# Patient Record
Sex: MALE | Race: WHITE | NOT HISPANIC OR LATINO | Employment: OTHER | URBAN - METROPOLITAN AREA
[De-identification: names, ages, dates, MRNs, and addresses within clinical notes are randomized per-mention and may not be internally consistent; named-entity substitution may affect disease eponyms.]

---

## 2023-09-14 ENCOUNTER — OFFICE VISIT (OUTPATIENT)
Dept: PRIMARY CARE | Facility: CLINIC | Age: 78
End: 2023-09-14
Payer: MEDICARE

## 2023-09-14 ENCOUNTER — LAB (OUTPATIENT)
Dept: LAB | Facility: LAB | Age: 78
End: 2023-09-14
Payer: MEDICARE

## 2023-09-14 VITALS
WEIGHT: 174.8 LBS | BODY MASS INDEX: 25.81 KG/M2 | DIASTOLIC BLOOD PRESSURE: 74 MMHG | OXYGEN SATURATION: 96 % | SYSTOLIC BLOOD PRESSURE: 130 MMHG | HEART RATE: 70 BPM

## 2023-09-14 DIAGNOSIS — Z00.00 WELLNESS EXAMINATION: ICD-10-CM

## 2023-09-14 DIAGNOSIS — N52.9 MALE ERECTILE DISORDER: ICD-10-CM

## 2023-09-14 DIAGNOSIS — Z13.0 SCREENING FOR DEFICIENCY ANEMIA: ICD-10-CM

## 2023-09-14 DIAGNOSIS — Z13.6 ENCOUNTER FOR SCREENING FOR CARDIOVASCULAR DISORDERS: ICD-10-CM

## 2023-09-14 DIAGNOSIS — B00.1 RECURRENT COLD SORES: ICD-10-CM

## 2023-09-14 DIAGNOSIS — N40.1 BENIGN PROSTATIC HYPERPLASIA WITH LOWER URINARY TRACT SYMPTOMS, SYMPTOM DETAILS UNSPECIFIED: ICD-10-CM

## 2023-09-14 DIAGNOSIS — Z13.29 SCREENING FOR THYROID DISORDER: ICD-10-CM

## 2023-09-14 DIAGNOSIS — D69.6 THROMBOCYTOPENIA (CMS-HCC): Primary | ICD-10-CM

## 2023-09-14 DIAGNOSIS — I10 BENIGN ESSENTIAL HYPERTENSION: ICD-10-CM

## 2023-09-14 DIAGNOSIS — Z12.5 PROSTATE CANCER SCREENING: ICD-10-CM

## 2023-09-14 DIAGNOSIS — D69.6 THROMBOCYTOPENIA (CMS-HCC): ICD-10-CM

## 2023-09-14 PROBLEM — H91.90 HEARING LOSS: Status: ACTIVE | Noted: 2023-09-14

## 2023-09-14 PROBLEM — L30.9 ECZEMA: Status: ACTIVE | Noted: 2023-09-14

## 2023-09-14 PROBLEM — R39.9 LOWER URINARY TRACT SYMPTOMS (LUTS): Status: ACTIVE | Noted: 2023-09-14

## 2023-09-14 PROBLEM — N40.0 BPH (BENIGN PROSTATIC HYPERPLASIA): Status: ACTIVE | Noted: 2023-09-14

## 2023-09-14 PROBLEM — D22.9 ATYPICAL NEVUS: Status: ACTIVE | Noted: 2023-09-14

## 2023-09-14 LAB
ALANINE AMINOTRANSFERASE (SGPT) (U/L) IN SER/PLAS: 14 U/L (ref 10–52)
ALBUMIN (G/DL) IN SER/PLAS: 4.3 G/DL (ref 3.4–5)
ALBUMIN (MG/L) IN URINE: 8.2 MG/L
ALBUMIN/CREATININE (UG/MG) IN URINE: 7.9 UG/MG CRT (ref 0–30)
ALKALINE PHOSPHATASE (U/L) IN SER/PLAS: 108 U/L (ref 33–136)
ANION GAP IN SER/PLAS: 10 MMOL/L (ref 10–20)
APPEARANCE, URINE: CLEAR
ASPARTATE AMINOTRANSFERASE (SGOT) (U/L) IN SER/PLAS: 16 U/L (ref 9–39)
BASOPHILS (10*3/UL) IN BLOOD BY AUTOMATED COUNT: 0.02 X10E9/L (ref 0–0.1)
BASOPHILS/100 LEUKOCYTES IN BLOOD BY AUTOMATED COUNT: 0.5 % (ref 0–2)
BILIRUBIN TOTAL (MG/DL) IN SER/PLAS: 0.9 MG/DL (ref 0–1.2)
BILIRUBIN, URINE: NEGATIVE
BLOOD, URINE: NEGATIVE
CALCIUM (MG/DL) IN SER/PLAS: 9.5 MG/DL (ref 8.6–10.6)
CARBON DIOXIDE, TOTAL (MMOL/L) IN SER/PLAS: 27 MMOL/L (ref 21–32)
CHLORIDE (MMOL/L) IN SER/PLAS: 106 MMOL/L (ref 98–107)
CHOLESTEROL (MG/DL) IN SER/PLAS: 114 MG/DL (ref 0–199)
CHOLESTEROL IN HDL (MG/DL) IN SER/PLAS: 43.4 MG/DL
CHOLESTEROL/HDL RATIO: 2.6
COLOR, URINE: YELLOW
CREATININE (MG/DL) IN SER/PLAS: 0.89 MG/DL (ref 0.5–1.3)
CREATININE (MG/DL) IN URINE: 104 MG/DL (ref 20–370)
EOSINOPHILS (10*3/UL) IN BLOOD BY AUTOMATED COUNT: 0.06 X10E9/L (ref 0–0.4)
EOSINOPHILS/100 LEUKOCYTES IN BLOOD BY AUTOMATED COUNT: 1.4 % (ref 0–6)
ERYTHROCYTE DISTRIBUTION WIDTH (RATIO) BY AUTOMATED COUNT: 12.3 % (ref 11.5–14.5)
ERYTHROCYTE MEAN CORPUSCULAR HEMOGLOBIN CONCENTRATION (G/DL) BY AUTOMATED: 33.6 G/DL (ref 32–36)
ERYTHROCYTE MEAN CORPUSCULAR VOLUME (FL) BY AUTOMATED COUNT: 107 FL (ref 80–100)
ERYTHROCYTES (10*6/UL) IN BLOOD BY AUTOMATED COUNT: 4.32 X10E12/L (ref 4.5–5.9)
GFR MALE: 89 ML/MIN/1.73M2
GLUCOSE (MG/DL) IN SER/PLAS: 104 MG/DL (ref 74–99)
GLUCOSE, URINE: NEGATIVE MG/DL
HEMATOCRIT (%) IN BLOOD BY AUTOMATED COUNT: 46.1 % (ref 41–52)
HEMOGLOBIN (G/DL) IN BLOOD: 15.5 G/DL (ref 13.5–17.5)
IMMATURE GRANULOCYTES/100 LEUKOCYTES IN BLOOD BY AUTOMATED COUNT: 0.2 % (ref 0–0.9)
KETONES, URINE: NEGATIVE MG/DL
LDL: 55 MG/DL (ref 0–99)
LEUKOCYTE ESTERASE, URINE: NEGATIVE
LEUKOCYTES (10*3/UL) IN BLOOD BY AUTOMATED COUNT: 4.3 X10E9/L (ref 4.4–11.3)
LYMPHOCYTES (10*3/UL) IN BLOOD BY AUTOMATED COUNT: 1.13 X10E9/L (ref 0.8–3)
LYMPHOCYTES/100 LEUKOCYTES IN BLOOD BY AUTOMATED COUNT: 26 % (ref 13–44)
MONOCYTES (10*3/UL) IN BLOOD BY AUTOMATED COUNT: 0.42 X10E9/L (ref 0.05–0.8)
MONOCYTES/100 LEUKOCYTES IN BLOOD BY AUTOMATED COUNT: 9.7 % (ref 2–10)
NEUTROPHILS (10*3/UL) IN BLOOD BY AUTOMATED COUNT: 2.7 X10E9/L (ref 1.6–5.5)
NEUTROPHILS/100 LEUKOCYTES IN BLOOD BY AUTOMATED COUNT: 62.2 % (ref 40–80)
NITRITE, URINE: NEGATIVE
NRBC (PER 100 WBCS) BY AUTOMATED COUNT: 0 /100 WBC (ref 0–0)
PH, URINE: 5 (ref 5–8)
PLATELETS (10*3/UL) IN BLOOD AUTOMATED COUNT: 109 X10E9/L (ref 150–450)
POTASSIUM (MMOL/L) IN SER/PLAS: 4.2 MMOL/L (ref 3.5–5.3)
PROTEIN TOTAL: 6.7 G/DL (ref 6.4–8.2)
PROTEIN, URINE: NEGATIVE MG/DL
SODIUM (MMOL/L) IN SER/PLAS: 139 MMOL/L (ref 136–145)
SPECIFIC GRAVITY, URINE: 1.01 (ref 1–1.03)
THYROTROPIN (MIU/L) IN SER/PLAS BY DETECTION LIMIT <= 0.05 MIU/L: 0.7 MIU/L (ref 0.44–3.98)
TRIGLYCERIDE (MG/DL) IN SER/PLAS: 77 MG/DL (ref 0–149)
UREA NITROGEN (MG/DL) IN SER/PLAS: 17 MG/DL (ref 6–23)
UROBILINOGEN, URINE: <2 MG/DL (ref 0–1.9)
VLDL: 15 MG/DL (ref 0–40)

## 2023-09-14 PROCEDURE — 84154 ASSAY OF PSA FREE: CPT

## 2023-09-14 PROCEDURE — 3078F DIAST BP <80 MM HG: CPT | Performed by: STUDENT IN AN ORGANIZED HEALTH CARE EDUCATION/TRAINING PROGRAM

## 2023-09-14 PROCEDURE — 1126F AMNT PAIN NOTED NONE PRSNT: CPT | Performed by: STUDENT IN AN ORGANIZED HEALTH CARE EDUCATION/TRAINING PROGRAM

## 2023-09-14 PROCEDURE — 81003 URINALYSIS AUTO W/O SCOPE: CPT

## 2023-09-14 PROCEDURE — 3075F SYST BP GE 130 - 139MM HG: CPT | Performed by: STUDENT IN AN ORGANIZED HEALTH CARE EDUCATION/TRAINING PROGRAM

## 2023-09-14 PROCEDURE — 85025 COMPLETE CBC W/AUTO DIFF WBC: CPT

## 2023-09-14 PROCEDURE — 1036F TOBACCO NON-USER: CPT | Performed by: STUDENT IN AN ORGANIZED HEALTH CARE EDUCATION/TRAINING PROGRAM

## 2023-09-14 PROCEDURE — 80061 LIPID PANEL: CPT

## 2023-09-14 PROCEDURE — 82570 ASSAY OF URINE CREATININE: CPT

## 2023-09-14 PROCEDURE — 1159F MED LIST DOCD IN RCRD: CPT | Performed by: STUDENT IN AN ORGANIZED HEALTH CARE EDUCATION/TRAINING PROGRAM

## 2023-09-14 PROCEDURE — 84153 ASSAY OF PSA TOTAL: CPT

## 2023-09-14 PROCEDURE — 99214 OFFICE O/P EST MOD 30 MIN: CPT | Performed by: STUDENT IN AN ORGANIZED HEALTH CARE EDUCATION/TRAINING PROGRAM

## 2023-09-14 PROCEDURE — G0439 PPPS, SUBSEQ VISIT: HCPCS | Performed by: STUDENT IN AN ORGANIZED HEALTH CARE EDUCATION/TRAINING PROGRAM

## 2023-09-14 PROCEDURE — 82043 UR ALBUMIN QUANTITATIVE: CPT

## 2023-09-14 PROCEDURE — 80053 COMPREHEN METABOLIC PANEL: CPT

## 2023-09-14 PROCEDURE — 84443 ASSAY THYROID STIM HORMONE: CPT

## 2023-09-14 PROCEDURE — 1170F FXNL STATUS ASSESSED: CPT | Performed by: STUDENT IN AN ORGANIZED HEALTH CARE EDUCATION/TRAINING PROGRAM

## 2023-09-14 PROCEDURE — 1160F RVW MEDS BY RX/DR IN RCRD: CPT | Performed by: STUDENT IN AN ORGANIZED HEALTH CARE EDUCATION/TRAINING PROGRAM

## 2023-09-14 PROCEDURE — 36415 COLL VENOUS BLD VENIPUNCTURE: CPT

## 2023-09-14 RX ORDER — TADALAFIL 5 MG/1
5 TABLET ORAL DAILY
COMMUNITY
End: 2023-09-14 | Stop reason: ALTCHOICE

## 2023-09-14 RX ORDER — CLOBETASOL PROPIONATE 0.5 MG/G
1 CREAM TOPICAL 3 TIMES DAILY
COMMUNITY
Start: 2021-08-26 | End: 2023-09-14 | Stop reason: ALTCHOICE

## 2023-09-14 RX ORDER — LOSARTAN POTASSIUM 50 MG/1
50 TABLET ORAL DAILY
COMMUNITY
Start: 2011-03-10 | End: 2023-09-14 | Stop reason: SDUPTHER

## 2023-09-14 RX ORDER — ACYCLOVIR 400 MG/1
400 TABLET ORAL 3 TIMES DAILY PRN
Qty: 21 TABLET | Refills: 2 | Status: SHIPPED | OUTPATIENT
Start: 2023-09-14 | End: 2023-09-21

## 2023-09-14 RX ORDER — LOSARTAN POTASSIUM 50 MG/1
50 TABLET ORAL DAILY
Qty: 90 TABLET | Refills: 3 | Status: SHIPPED | OUTPATIENT
Start: 2023-09-14 | End: 2024-09-13

## 2023-09-14 RX ORDER — SILDENAFIL CITRATE 20 MG/1
TABLET ORAL
Qty: 50 TABLET | Refills: 11 | Status: SHIPPED | OUTPATIENT
Start: 2023-09-14

## 2023-09-14 RX ORDER — ACYCLOVIR 400 MG/1
400 TABLET ORAL 3 TIMES DAILY PRN
COMMUNITY
End: 2023-09-14 | Stop reason: SDUPTHER

## 2023-09-14 ASSESSMENT — ENCOUNTER SYMPTOMS
LOSS OF SENSATION IN FEET: 0
DEPRESSION: 0
OCCASIONAL FEELINGS OF UNSTEADINESS: 0

## 2023-09-14 ASSESSMENT — PATIENT HEALTH QUESTIONNAIRE - PHQ9
SUM OF ALL RESPONSES TO PHQ9 QUESTIONS 1 AND 2: 0
1. LITTLE INTEREST OR PLEASURE IN DOING THINGS: NOT AT ALL
2. FEELING DOWN, DEPRESSED OR HOPELESS: NOT AT ALL

## 2023-09-14 ASSESSMENT — ACTIVITIES OF DAILY LIVING (ADL)
TAKING_MEDICATION: INDEPENDENT
MANAGING_FINANCES: INDEPENDENT
GROCERY_SHOPPING: INDEPENDENT
DOING_HOUSEWORK: INDEPENDENT
BATHING: INDEPENDENT
DRESSING: INDEPENDENT

## 2023-09-14 ASSESSMENT — PAIN SCALES - GENERAL: PAINLEVEL: 0-NO PAIN

## 2023-09-14 NOTE — PROGRESS NOTES
Subjective   Patient ID: Ricci Granados is a 78 y.o. male who presents for Medicare Annual Wellness Visit Subsequent (He is fasting.) and Med Refill.    HPI comes in for Medicare wellness.  No complaints    Review of Systems  Constitutional: NO F, chills, or sweats  Eyes: no blurred vision or visual disturbance  ENT: no hearing loss, no congestion, no nasal discharge, no hoarseness and no sore throat.   Cardiovascular: no chest pain, no edema, no palps and no syncope.   Respiratory: no cough,no s.o.b. and no wheezing  Gastrointestinal: no abdominal pain, No C/D no N/V, no blood in stools  Genitourinary: no dysuria, no change in urinary frequency, no urinary hesitancy and no feelings of urinary urgency.   Musculoskeletal: no arthralgias,  no back pain and no myalgias.   Integumentary: no new skin lesions and no rashes.   Neurological: no difficulty walking, no headache, no limb weakness, no numbness and no tingling.   Psychiatric: no anxiety, no depression, no anhedonia and no substance use disorders.   Endocrine: no recent weight gain and no recent weight loss.   Hematologic/Lymphatic: no tendency for easy bruising and no swollen glands.  Objective   /74 (BP Location: Left arm, Patient Position: Sitting, BP Cuff Size: Adult)   Pulse 70   Wt 79.3 kg (174 lb 12.8 oz)   SpO2 96%   BMI 25.81 kg/m²     Physical Exam  gen- a & o x 3, nad, pleasant  heent- eomi, perrla, ear canals patent, TM's non-erythematous, no fluid, frontal and maxillary sinus's nontender  neck- supple, nontender, no palpable or enlarged nodes, no thyromegaly  heart- rrr, no murmurs  lungs- cta b/l , no w/r/r  chest- symmetric, nontender  ab- soft, nontender, no palpable organomegaly, postive bowel sounds  ex's- no c/c/e  neuro- CNs 2-12 grossly intact, full sensation and strength in all extremities    Assessment/Plan     #1. Well visit physical. Medicare wellness.  No concerns on exam.  Fasting blood work today we'll call with results in  1-2 weeks..  Vaccinations are up-to-date.  Colonoscopy up-to-date until 2025.     #2. Hypertension. Currently stable. Continue losartan 50 mg once per day in the morning.     #3. BPH, lower urinary tract symptoms.  Stable without meds at this time.  Prescription for generic Viagra for ED     #4. Borderline low platelets.  Have been stable  Continue to monitor CBC ordered today.     #5. Recurrent cold sores.  Refilled acyclovir to use as needed.

## 2023-09-14 NOTE — PATIENT INSTRUCTIONS
#1. Well visit physical. Medicare wellness.  No concerns on exam.  Fasting blood work today we'll call with results in 1-2 weeks..  Vaccinations are up-to-date.  Colonoscopy up-to-date until 2025.     #2. Hypertension. Currently stable. Continue losartan 50 mg once per day in the morning.     #3. BPH, lower urinary tract symptoms.  Stable without meds at this time.  Prescription for generic Viagra for ED     #4. Borderline low platelets.  Have been stable  Continue to monitor CBC ordered today.     #5. Recurrent cold sores.  Refilled acyclovir to use as needed.

## 2023-09-18 LAB
PROSTATE SPECIFIC AG (NG/ML) IN SER/PLAS: 4.2 NG/ML (ref 0–4)
PROSTATE SPECIFIC AG FREE (NG/ML) IN SER/PLAS: 1.7 NG/ML
PROSTATE SPECIFIC AG FREE/PROSTATE SPECIFIC AG TOTAL IN SER/PLAS: 40 %

## 2023-09-20 ENCOUNTER — TELEPHONE (OUTPATIENT)
Dept: PRIMARY CARE | Facility: CLINIC | Age: 78
End: 2023-09-20
Payer: MEDICARE

## 2023-09-21 ENCOUNTER — TELEPHONE (OUTPATIENT)
Dept: PRIMARY CARE | Facility: CLINIC | Age: 78
End: 2023-09-21
Payer: MEDICARE

## 2024-04-11 ENCOUNTER — OFFICE VISIT (OUTPATIENT)
Dept: PRIMARY CARE | Facility: CLINIC | Age: 79
End: 2024-04-11
Payer: MEDICARE

## 2024-04-11 VITALS
HEART RATE: 62 BPM | WEIGHT: 185 LBS | BODY MASS INDEX: 27.32 KG/M2 | SYSTOLIC BLOOD PRESSURE: 148 MMHG | DIASTOLIC BLOOD PRESSURE: 70 MMHG | OXYGEN SATURATION: 98 %

## 2024-04-11 DIAGNOSIS — D22.9 ATYPICAL NEVUS: Primary | ICD-10-CM

## 2024-04-11 PROCEDURE — 99213 OFFICE O/P EST LOW 20 MIN: CPT | Performed by: STUDENT IN AN ORGANIZED HEALTH CARE EDUCATION/TRAINING PROGRAM

## 2024-04-11 PROCEDURE — 88305 TISSUE EXAM BY PATHOLOGIST: CPT | Performed by: DERMATOLOGY

## 2024-04-11 PROCEDURE — 1036F TOBACCO NON-USER: CPT | Performed by: STUDENT IN AN ORGANIZED HEALTH CARE EDUCATION/TRAINING PROGRAM

## 2024-04-11 PROCEDURE — 3078F DIAST BP <80 MM HG: CPT | Performed by: STUDENT IN AN ORGANIZED HEALTH CARE EDUCATION/TRAINING PROGRAM

## 2024-04-11 PROCEDURE — 1160F RVW MEDS BY RX/DR IN RCRD: CPT | Performed by: STUDENT IN AN ORGANIZED HEALTH CARE EDUCATION/TRAINING PROGRAM

## 2024-04-11 PROCEDURE — 1159F MED LIST DOCD IN RCRD: CPT | Performed by: STUDENT IN AN ORGANIZED HEALTH CARE EDUCATION/TRAINING PROGRAM

## 2024-04-11 PROCEDURE — 1126F AMNT PAIN NOTED NONE PRSNT: CPT | Performed by: STUDENT IN AN ORGANIZED HEALTH CARE EDUCATION/TRAINING PROGRAM

## 2024-04-11 PROCEDURE — 3077F SYST BP >= 140 MM HG: CPT | Performed by: STUDENT IN AN ORGANIZED HEALTH CARE EDUCATION/TRAINING PROGRAM

## 2024-04-11 ASSESSMENT — PAIN SCALES - GENERAL: PAINLEVEL: 0-NO PAIN

## 2024-04-11 ASSESSMENT — ENCOUNTER SYMPTOMS: DEPRESSION: 0

## 2024-04-11 NOTE — PROGRESS NOTES
Subjective   Patient ID: Ricci Granados is a 79 y.o. male who presents for Establish Care (Mole on back that has crust and blood around ).    HPI comes in for atypical nevus left posterior back for about 1 month.  Has been flaking and bleeding    Review of Systems  Constitutional: NO F, chills, or sweats  Integumentary: Concern for irregular nevus  Objective   /70 (BP Location: Left arm, Patient Position: Sitting)   Pulse 62   Wt 83.9 kg (185 lb)   SpO2 98%   BMI 27.32 kg/m²     Physical Exam  Derm-left posterior back near the left shoulder, 0.5 cm atypical nevus, erythematous scaly, has appearance of basal cell  Assessment/Plan     #1.  Atypical nevus left posterior back.  Shave biopsy today in clinic.  Specimen will be sent to pathology for further evaluation.  Discussed based on results we may need to see dermatology he is in agreement

## 2024-04-15 DIAGNOSIS — C44.91 BASAL CELL CARCINOMA (BCC), UNSPECIFIED SITE: Primary | ICD-10-CM

## 2024-04-15 LAB
LABORATORY COMMENT REPORT: NORMAL
PATH REPORT.FINAL DX SPEC: NORMAL
PATH REPORT.GROSS SPEC: NORMAL
PATH REPORT.MICROSCOPIC SPEC OTHER STN: NORMAL
PATH REPORT.RELEVANT HX SPEC: NORMAL
PATH REPORT.TOTAL CANCER: NORMAL

## 2024-09-11 ENCOUNTER — TELEPHONE (OUTPATIENT)
Dept: PRIMARY CARE | Facility: CLINIC | Age: 79
End: 2024-09-11
Payer: MEDICARE

## 2024-09-17 ENCOUNTER — APPOINTMENT (OUTPATIENT)
Dept: PRIMARY CARE | Facility: CLINIC | Age: 79
End: 2024-09-17
Payer: MEDICARE

## 2024-09-18 ENCOUNTER — APPOINTMENT (OUTPATIENT)
Dept: PRIMARY CARE | Facility: CLINIC | Age: 79
End: 2024-09-18
Payer: MEDICARE

## 2024-09-18 VITALS
HEIGHT: 69 IN | BODY MASS INDEX: 25.77 KG/M2 | OXYGEN SATURATION: 96 % | WEIGHT: 174 LBS | DIASTOLIC BLOOD PRESSURE: 88 MMHG | HEART RATE: 73 BPM | SYSTOLIC BLOOD PRESSURE: 161 MMHG

## 2024-09-18 DIAGNOSIS — R25.2 LEG CRAMPING: ICD-10-CM

## 2024-09-18 DIAGNOSIS — Z00.00 MEDICARE ANNUAL WELLNESS VISIT, SUBSEQUENT: Primary | ICD-10-CM

## 2024-09-18 DIAGNOSIS — Z71.89 GOALS OF CARE, COUNSELING/DISCUSSION: ICD-10-CM

## 2024-09-18 DIAGNOSIS — I10 BENIGN ESSENTIAL HYPERTENSION: ICD-10-CM

## 2024-09-18 PROCEDURE — 1160F RVW MEDS BY RX/DR IN RCRD: CPT | Performed by: INTERNAL MEDICINE

## 2024-09-18 PROCEDURE — 1159F MED LIST DOCD IN RCRD: CPT | Performed by: INTERNAL MEDICINE

## 2024-09-18 PROCEDURE — 3077F SYST BP >= 140 MM HG: CPT | Performed by: INTERNAL MEDICINE

## 2024-09-18 PROCEDURE — 1170F FXNL STATUS ASSESSED: CPT | Performed by: INTERNAL MEDICINE

## 2024-09-18 PROCEDURE — 3079F DIAST BP 80-89 MM HG: CPT | Performed by: INTERNAL MEDICINE

## 2024-09-18 PROCEDURE — G0439 PPPS, SUBSEQ VISIT: HCPCS | Performed by: INTERNAL MEDICINE

## 2024-09-18 PROCEDURE — 1036F TOBACCO NON-USER: CPT | Performed by: INTERNAL MEDICINE

## 2024-09-18 PROCEDURE — 1123F ACP DISCUSS/DSCN MKR DOCD: CPT | Performed by: INTERNAL MEDICINE

## 2024-09-18 PROCEDURE — 1158F ADVNC CARE PLAN TLK DOCD: CPT | Performed by: INTERNAL MEDICINE

## 2024-09-18 PROCEDURE — 99213 OFFICE O/P EST LOW 20 MIN: CPT | Performed by: INTERNAL MEDICINE

## 2024-09-18 RX ORDER — LOSARTAN POTASSIUM 100 MG/1
100 TABLET ORAL DAILY
Start: 2024-09-18 | End: 2025-09-18

## 2024-09-18 RX ORDER — ACYCLOVIR 400 MG/1
400 TABLET ORAL AS NEEDED
COMMUNITY

## 2024-09-18 ASSESSMENT — ACTIVITIES OF DAILY LIVING (ADL)
BATHING: INDEPENDENT
DOING_HOUSEWORK: INDEPENDENT
DRESSING: INDEPENDENT
TAKING_MEDICATION: INDEPENDENT
MANAGING_FINANCES: INDEPENDENT
GROCERY_SHOPPING: INDEPENDENT

## 2024-09-18 ASSESSMENT — PATIENT HEALTH QUESTIONNAIRE - PHQ9
1. LITTLE INTEREST OR PLEASURE IN DOING THINGS: NOT AT ALL
2. FEELING DOWN, DEPRESSED OR HOPELESS: NOT AT ALL
SUM OF ALL RESPONSES TO PHQ9 QUESTIONS 1 AND 2: 0

## 2024-09-18 ASSESSMENT — ENCOUNTER SYMPTOMS
FEVER: 0
COUGH: 1
SORE THROAT: 1

## 2024-09-18 NOTE — ASSESSMENT & PLAN NOTE
-BP elevated today in setting of being sick. When reviewing the chart, can see BP has been consistently higher this year. Will double losartan dose from 50mg daily to 100mg daily.  Orders:    Comprehensive metabolic panel; Future    CBC; Future    Magnesium; Future    losartan (Cozaar) 100 mg tablet; Take 1 tablet (100 mg) by mouth once daily.

## 2024-09-18 NOTE — PROGRESS NOTES
"Subjective   Reason for Visit: Ricci Granados is an 79 y.o. male here for a Medicare Wellness visit.               Here for MAW. Getting established from Dr. Green's office.    Pt has a sore throat and cough that started 9/10. His wife was found to have COVID 9/7.    PMH:  -HTN: Taking losartan.  -BPH: Not on medication at this time. Has nocturia.  -Melanoma: Sees [facility] dermatology. Recently had a lesion removed from his back.    Tries to be exercise every morning with his wife. Has been more limited this week d/t recovering from COVID.    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      Patient Care Team:  Evaristo Tijerina MD as PCP - General (Internal Medicine)  Wes Green DO as PCP - [employer] PCP     Review of Systems   Constitutional:  Negative for fever.   HENT:  Positive for sore throat.    Respiratory:  Positive for cough.        Objective   Vitals:  /88   Pulse 73   Ht 1.753 m (5' 9\")   Wt 78.9 kg (174 lb)   SpO2 96%   BMI 25.70 kg/m²       Physical Exam  Constitutional:       General: He is not in acute distress.     Appearance: He is not ill-appearing, toxic-appearing or diaphoretic.   HENT:      Head: Normocephalic and atraumatic.   Eyes:      Conjunctiva/sclera: Conjunctivae normal.   Neurological:      Mental Status: He is alert.       Assessment & Plan  Medicare annual wellness visit, subsequent    Orders:  •  PSA; Future  •  Ferritin; Future  •  Hemoglobin A1c; Future    Goals of care, counseling/discussion         Leg cramping  -Happening more at night. Will check electrolytes and ferritin lvl.       Benign essential hypertension  -BP elevated today in setting of being sick. When reviewing the chart, can see BP has been consistently higher this year. Will double losartan dose from 50mg daily to 100mg daily.  Orders:  •  Comprehensive metabolic panel; Future  •  CBC; Future  •  Magnesium; Future  •  losartan (Cozaar) 100 mg tablet; Take 1 tablet (100 " mg) by mouth once daily.         Advance Directives Discussion  Advanced Care Planning (including a Living Will, Healthcare POA, as well as specific end of life choices and/or directives), was discussed with the patient and/or surrogate, voluntarily, and details of that discussion documented in the Problem List (under Advanced Directives Discussion) of the medical record.  Pt does have a living will and HCPOA. States his wife would be his HCPOA, followed by his son as the alternate (only has one child). Contact numbers added to the chart. Pt would want to be full code.   (~16 min spent discussing above)

## 2024-09-26 ENCOUNTER — LAB (OUTPATIENT)
Dept: LAB | Facility: LAB | Age: 79
End: 2024-09-26
Payer: MEDICARE

## 2024-09-26 DIAGNOSIS — I10 BENIGN ESSENTIAL HYPERTENSION: ICD-10-CM

## 2024-09-26 DIAGNOSIS — Z00.00 MEDICARE ANNUAL WELLNESS VISIT, SUBSEQUENT: ICD-10-CM

## 2024-09-26 LAB
ALBUMIN SERPL BCP-MCNC: 3.9 G/DL (ref 3.4–5)
ALP SERPL-CCNC: 96 U/L (ref 33–136)
ALT SERPL W P-5'-P-CCNC: 15 U/L (ref 10–52)
ANION GAP SERPL CALC-SCNC: 15 MMOL/L (ref 10–20)
AST SERPL W P-5'-P-CCNC: 13 U/L (ref 9–39)
BILIRUB SERPL-MCNC: 0.6 MG/DL (ref 0–1.2)
BUN SERPL-MCNC: 26 MG/DL (ref 6–23)
CALCIUM SERPL-MCNC: 9.1 MG/DL (ref 8.6–10.6)
CHLORIDE SERPL-SCNC: 109 MMOL/L (ref 98–107)
CO2 SERPL-SCNC: 24 MMOL/L (ref 21–32)
CREAT SERPL-MCNC: 1.65 MG/DL (ref 0.5–1.3)
EGFRCR SERPLBLD CKD-EPI 2021: 43 ML/MIN/1.73M*2
ERYTHROCYTE [DISTWIDTH] IN BLOOD BY AUTOMATED COUNT: 12.1 % (ref 11.5–14.5)
EST. AVERAGE GLUCOSE BLD GHB EST-MCNC: 126 MG/DL
FERRITIN SERPL-MCNC: 495 NG/ML (ref 20–300)
GLUCOSE SERPL-MCNC: 92 MG/DL (ref 74–99)
HBA1C MFR BLD: 6 %
HCT VFR BLD AUTO: 38.2 % (ref 41–52)
HGB BLD-MCNC: 12.4 G/DL (ref 13.5–17.5)
MAGNESIUM SERPL-MCNC: 2.17 MG/DL (ref 1.6–2.4)
MCH RBC QN AUTO: 34.3 PG (ref 26–34)
MCHC RBC AUTO-ENTMCNC: 32.5 G/DL (ref 32–36)
MCV RBC AUTO: 106 FL (ref 80–100)
NRBC BLD-RTO: 0 /100 WBCS (ref 0–0)
PLATELET # BLD AUTO: 176 X10*3/UL (ref 150–450)
POTASSIUM SERPL-SCNC: 4.3 MMOL/L (ref 3.5–5.3)
PROT SERPL-MCNC: 6.7 G/DL (ref 6.4–8.2)
PSA SERPL-MCNC: 7.07 NG/ML
RBC # BLD AUTO: 3.62 X10*6/UL (ref 4.5–5.9)
SODIUM SERPL-SCNC: 144 MMOL/L (ref 136–145)
WBC # BLD AUTO: 4.7 X10*3/UL (ref 4.4–11.3)

## 2024-09-26 PROCEDURE — 83735 ASSAY OF MAGNESIUM: CPT

## 2024-09-26 PROCEDURE — 84153 ASSAY OF PSA TOTAL: CPT

## 2024-09-26 PROCEDURE — 83036 HEMOGLOBIN GLYCOSYLATED A1C: CPT

## 2024-09-26 PROCEDURE — 80053 COMPREHEN METABOLIC PANEL: CPT

## 2024-09-26 PROCEDURE — 36415 COLL VENOUS BLD VENIPUNCTURE: CPT

## 2024-09-26 PROCEDURE — 82728 ASSAY OF FERRITIN: CPT

## 2024-09-26 PROCEDURE — 85027 COMPLETE CBC AUTOMATED: CPT

## 2024-09-27 DIAGNOSIS — R97.20 ELEVATED PSA: Primary | ICD-10-CM

## 2024-09-27 DIAGNOSIS — I10 BENIGN ESSENTIAL HYPERTENSION: ICD-10-CM

## 2024-09-27 RX ORDER — AMLODIPINE BESYLATE 5 MG/1
5 TABLET ORAL DAILY
Qty: 30 TABLET | Refills: 4 | Status: SHIPPED | OUTPATIENT
Start: 2024-09-27

## 2024-09-27 NOTE — RESULT ENCOUNTER NOTE
Tried calling patient x3 to review labwork that resulted (particularly elevated PSA level and worsened kidney function after increasing losartan dose). No answer. Voicemail left for patient to give us a call back.

## 2024-09-27 NOTE — PROGRESS NOTES
Pt called back. We reviewed his elevated prostate level. Pt w/o new  symptoms. Is agreeable to seeing urology. Referral placed and number provided.    We discussed kidney labwork as well and how it worsened after doubling the dose of losartan. Pt to go back to 1 losartan a day. Will repeat BMP the day before his next appt. Discussed amlodipine and side effects; pt agreeable with starting this in the meantime. All questions from pt and his wife answered.      09/27/24 at 3:19 PM - Evaristo Tijerina MD

## 2024-10-04 ENCOUNTER — LAB (OUTPATIENT)
Dept: LAB | Facility: LAB | Age: 79
End: 2024-10-04
Payer: MEDICARE

## 2024-10-04 DIAGNOSIS — I10 BENIGN ESSENTIAL HYPERTENSION: ICD-10-CM

## 2024-10-04 LAB
ANION GAP SERPL CALC-SCNC: 12 MMOL/L (ref 10–20)
BUN SERPL-MCNC: 24 MG/DL (ref 6–23)
CALCIUM SERPL-MCNC: 9 MG/DL (ref 8.6–10.6)
CHLORIDE SERPL-SCNC: 107 MMOL/L (ref 98–107)
CO2 SERPL-SCNC: 27 MMOL/L (ref 21–32)
CREAT SERPL-MCNC: 1.89 MG/DL (ref 0.5–1.3)
EGFRCR SERPLBLD CKD-EPI 2021: 36 ML/MIN/1.73M*2
GLUCOSE SERPL-MCNC: 100 MG/DL (ref 74–99)
POTASSIUM SERPL-SCNC: 4.1 MMOL/L (ref 3.5–5.3)
SODIUM SERPL-SCNC: 142 MMOL/L (ref 136–145)

## 2024-10-04 PROCEDURE — 80048 BASIC METABOLIC PNL TOTAL CA: CPT

## 2024-10-04 PROCEDURE — 36415 COLL VENOUS BLD VENIPUNCTURE: CPT

## 2024-10-08 ENCOUNTER — APPOINTMENT (OUTPATIENT)
Dept: PRIMARY CARE | Facility: CLINIC | Age: 79
End: 2024-10-08
Payer: MEDICARE

## 2024-10-08 VITALS
OXYGEN SATURATION: 94 % | SYSTOLIC BLOOD PRESSURE: 166 MMHG | HEIGHT: 69 IN | WEIGHT: 175 LBS | BODY MASS INDEX: 25.92 KG/M2 | DIASTOLIC BLOOD PRESSURE: 75 MMHG | HEART RATE: 65 BPM

## 2024-10-08 DIAGNOSIS — B00.1 COLD SORE: ICD-10-CM

## 2024-10-08 DIAGNOSIS — N52.9 ERECTILE DISORDER: ICD-10-CM

## 2024-10-08 DIAGNOSIS — I10 BENIGN ESSENTIAL HYPERTENSION: Primary | ICD-10-CM

## 2024-10-08 DIAGNOSIS — N17.9 AKI (ACUTE KIDNEY INJURY) (CMS-HCC): ICD-10-CM

## 2024-10-08 PROBLEM — D69.6 THROMBOCYTOPENIA (CMS-HCC): Status: RESOLVED | Noted: 2023-09-14 | Resolved: 2024-10-08

## 2024-10-08 PROCEDURE — 1158F ADVNC CARE PLAN TLK DOCD: CPT | Performed by: INTERNAL MEDICINE

## 2024-10-08 PROCEDURE — 1159F MED LIST DOCD IN RCRD: CPT | Performed by: INTERNAL MEDICINE

## 2024-10-08 PROCEDURE — 3077F SYST BP >= 140 MM HG: CPT | Performed by: INTERNAL MEDICINE

## 2024-10-08 PROCEDURE — 99213 OFFICE O/P EST LOW 20 MIN: CPT | Performed by: INTERNAL MEDICINE

## 2024-10-08 PROCEDURE — 1123F ACP DISCUSS/DSCN MKR DOCD: CPT | Performed by: INTERNAL MEDICINE

## 2024-10-08 PROCEDURE — 3078F DIAST BP <80 MM HG: CPT | Performed by: INTERNAL MEDICINE

## 2024-10-08 PROCEDURE — 1036F TOBACCO NON-USER: CPT | Performed by: INTERNAL MEDICINE

## 2024-10-08 RX ORDER — ATENOLOL 25 MG/1
25 TABLET ORAL DAILY
Qty: 30 TABLET | Refills: 5 | Status: SHIPPED | OUTPATIENT
Start: 2024-10-08 | End: 2025-04-06

## 2024-10-08 RX ORDER — ACYCLOVIR 400 MG/1
400 TABLET ORAL 3 TIMES DAILY PRN
Qty: 21 TABLET | Refills: 3 | Status: SHIPPED | OUTPATIENT
Start: 2024-10-08

## 2024-10-08 RX ORDER — SILDENAFIL CITRATE 20 MG/1
40-100 TABLET ORAL DAILY PRN
Qty: 30 TABLET | Refills: 11 | Status: SHIPPED | OUTPATIENT
Start: 2024-10-08 | End: 2025-10-08

## 2024-10-08 ASSESSMENT — ENCOUNTER SYMPTOMS
SORE THROAT: 1
COUGH: 1

## 2024-10-08 NOTE — ASSESSMENT & PLAN NOTE
-Kidney function worsened despite stopping losartan. After reviewing hx, found pt drinks 2 pitchers of iced tea a day. Concerned that this is dehydrating pt; he is to cut down to 1 pitcher at the most a day. To increase water intake. Will recheck labwork 1-2 days before next appt.

## 2024-10-08 NOTE — PROGRESS NOTES
"Subjective   Patient ID: Ricci Granados is a 79 y.o. male who presents for Follow-up.    Denies having any urinary symptoms. Pt stopped the losartan after getting the amlodipine. Pt states he lives on iced tea (drinks around 2 pitchers a day), but does also drink water.    Is seeing [facility] dermatology for follow up of basal cell carcinoma of the shoulders.    Has had more mucous building up in his throat and a non-productive cough since last appt. Denies congestion or post-nasal drip.        Review of Systems   HENT:  Positive for sore throat. Negative for congestion and postnasal drip.    Respiratory:  Positive for cough.        /75   Pulse 65   Ht 1.753 m (5' 9\")   Wt 79.4 kg (175 lb)   SpO2 94%   BMI 25.84 kg/m²   Objective   Physical Exam  Constitutional:       General: He is not in acute distress.     Appearance: He is not ill-appearing, toxic-appearing or diaphoretic.   HENT:      Head: Normocephalic and atraumatic.   Eyes:      Conjunctiva/sclera: Conjunctivae normal.   Neurological:      Mental Status: He is alert.         Assessment/Plan   Problem List Items Addressed This Visit             ICD-10-CM    Benign essential hypertension - Primary I10     -BP elevated today on just amlodipine 5mg daily. Will double dose to 10mg daily.  -Reviewed that its unlikely this alone will control BP; will add atenolol. Reviewed side effects. Pt agreeable. Will see back in 2-3 weeks to follow up BP.         Relevant Medications    atenolol (Tenormin) 25 mg tablet    Other Relevant Orders    Basic metabolic panel    Erectile disorder N52.9     -Refilled medication.         Relevant Medications    sildenafil (Revatio) 20 mg tablet    AMARA (acute kidney injury) (CMS-HCC) N17.9     -Kidney function worsened despite stopping losartan. After reviewing hx, found pt drinks 2 pitchers of iced tea a day. Concerned that this is dehydrating pt; he is to cut down to 1 pitcher at the most a day. To increase water intake. Will " recheck labwork 1-2 days before next appt.          Other Visit Diagnoses         Codes    Cold sore     B00.1    Relevant Medications    acyclovir (Zovirax) 400 mg tablet                 Evaristo Tijerina MD 10/08/24 10:59 AM

## 2024-10-08 NOTE — ASSESSMENT & PLAN NOTE
-BP elevated today on just amlodipine 5mg daily. Will double dose to 10mg daily.  -Reviewed that its unlikely this alone will control BP; will add atenolol. Reviewed side effects. Pt agreeable. Will see back in 2-3 weeks to follow up BP.

## 2024-10-17 ENCOUNTER — TELEPHONE (OUTPATIENT)
Dept: PRIMARY CARE | Facility: CLINIC | Age: 79
End: 2024-10-17
Payer: MEDICARE

## 2024-10-17 DIAGNOSIS — I10 BENIGN ESSENTIAL HYPERTENSION: ICD-10-CM

## 2024-10-17 RX ORDER — AMLODIPINE BESYLATE 10 MG/1
10 TABLET ORAL DAILY
Qty: 90 TABLET | Refills: 3 | Status: SHIPPED | OUTPATIENT
Start: 2024-10-17

## 2024-10-24 ENCOUNTER — LAB (OUTPATIENT)
Dept: LAB | Facility: LAB | Age: 79
End: 2024-10-24
Payer: MEDICARE

## 2024-10-24 DIAGNOSIS — I10 BENIGN ESSENTIAL HYPERTENSION: ICD-10-CM

## 2024-10-24 LAB
ANION GAP SERPL CALC-SCNC: 14 MMOL/L (ref 10–20)
BUN SERPL-MCNC: 31 MG/DL (ref 6–23)
CALCIUM SERPL-MCNC: 8.8 MG/DL (ref 8.6–10.6)
CHLORIDE SERPL-SCNC: 107 MMOL/L (ref 98–107)
CO2 SERPL-SCNC: 27 MMOL/L (ref 21–32)
CREAT SERPL-MCNC: 2.05 MG/DL (ref 0.5–1.3)
EGFRCR SERPLBLD CKD-EPI 2021: 33 ML/MIN/1.73M*2
GLUCOSE SERPL-MCNC: 133 MG/DL (ref 74–99)
POTASSIUM SERPL-SCNC: 4.3 MMOL/L (ref 3.5–5.3)
SODIUM SERPL-SCNC: 144 MMOL/L (ref 136–145)

## 2024-10-24 PROCEDURE — 36415 COLL VENOUS BLD VENIPUNCTURE: CPT

## 2024-10-24 PROCEDURE — 80048 BASIC METABOLIC PNL TOTAL CA: CPT

## 2024-10-28 ENCOUNTER — LAB (OUTPATIENT)
Dept: LAB | Facility: LAB | Age: 79
End: 2024-10-28
Payer: MEDICARE

## 2024-10-28 ENCOUNTER — APPOINTMENT (OUTPATIENT)
Dept: PRIMARY CARE | Facility: CLINIC | Age: 79
End: 2024-10-28
Payer: MEDICARE

## 2024-10-28 VITALS
SYSTOLIC BLOOD PRESSURE: 160 MMHG | WEIGHT: 177.9 LBS | BODY MASS INDEX: 26.27 KG/M2 | HEART RATE: 58 BPM | TEMPERATURE: 98 F | RESPIRATION RATE: 22 BRPM | OXYGEN SATURATION: 94 % | DIASTOLIC BLOOD PRESSURE: 80 MMHG

## 2024-10-28 DIAGNOSIS — N17.9 AKI (ACUTE KIDNEY INJURY) (CMS-HCC): ICD-10-CM

## 2024-10-28 DIAGNOSIS — I10 BENIGN ESSENTIAL HYPERTENSION: Primary | ICD-10-CM

## 2024-10-28 LAB
APPEARANCE UR: CLEAR
BILIRUB UR STRIP.AUTO-MCNC: NEGATIVE MG/DL
COLOR UR: NORMAL
CREAT UR-MCNC: 57.6 MG/DL (ref 20–370)
GLUCOSE UR STRIP.AUTO-MCNC: NORMAL MG/DL
KETONES UR STRIP.AUTO-MCNC: NEGATIVE MG/DL
LEUKOCYTE ESTERASE UR QL STRIP.AUTO: NEGATIVE
NITRITE UR QL STRIP.AUTO: NEGATIVE
PH UR STRIP.AUTO: 5.5 [PH]
PROT UR STRIP.AUTO-MCNC: NEGATIVE MG/DL
RBC # UR STRIP.AUTO: NEGATIVE /UL
SODIUM UR-SCNC: 67 MMOL/L
SODIUM/CREAT UR-RTO: 116 MMOL/G CREAT
SP GR UR STRIP.AUTO: 1.01
UROBILINOGEN UR STRIP.AUTO-MCNC: NORMAL MG/DL

## 2024-10-28 PROCEDURE — 1123F ACP DISCUSS/DSCN MKR DOCD: CPT | Performed by: INTERNAL MEDICINE

## 2024-10-28 PROCEDURE — 82570 ASSAY OF URINE CREATININE: CPT

## 2024-10-28 PROCEDURE — 3077F SYST BP >= 140 MM HG: CPT | Performed by: INTERNAL MEDICINE

## 2024-10-28 PROCEDURE — 99214 OFFICE O/P EST MOD 30 MIN: CPT | Performed by: INTERNAL MEDICINE

## 2024-10-28 PROCEDURE — 84300 ASSAY OF URINE SODIUM: CPT

## 2024-10-28 PROCEDURE — 1159F MED LIST DOCD IN RCRD: CPT | Performed by: INTERNAL MEDICINE

## 2024-10-28 PROCEDURE — 81003 URINALYSIS AUTO W/O SCOPE: CPT

## 2024-10-28 PROCEDURE — 3078F DIAST BP <80 MM HG: CPT | Performed by: INTERNAL MEDICINE

## 2024-10-28 RX ORDER — CARVEDILOL PHOSPHATE 10 MG/1
10 CAPSULE, EXTENDED RELEASE ORAL DAILY
Qty: 30 CAPSULE | Refills: 11 | Status: SHIPPED | OUTPATIENT
Start: 2024-10-28 | End: 2025-10-28

## 2024-10-28 RX ORDER — ACETAMINOPHEN 500 MG
TABLET ORAL
Qty: 1 KIT | Refills: 0 | Status: SHIPPED | OUTPATIENT
Start: 2024-10-28

## 2024-10-28 ASSESSMENT — PATIENT HEALTH QUESTIONNAIRE - PHQ9
SUM OF ALL RESPONSES TO PHQ9 QUESTIONS 1 AND 2: 0
2. FEELING DOWN, DEPRESSED OR HOPELESS: NOT AT ALL
1. LITTLE INTEREST OR PLEASURE IN DOING THINGS: NOT AT ALL

## 2024-10-28 ASSESSMENT — ENCOUNTER SYMPTOMS
LIGHT-HEADEDNESS: 0
DIZZINESS: 0
MYALGIAS: 1

## 2024-10-29 ENCOUNTER — TELEPHONE (OUTPATIENT)
Dept: PRIMARY CARE | Facility: CLINIC | Age: 79
End: 2024-10-29
Payer: MEDICARE

## 2024-10-29 DIAGNOSIS — N17.9 AKI (ACUTE KIDNEY INJURY) (CMS-HCC): Primary | ICD-10-CM

## 2024-10-31 ENCOUNTER — TELEPHONE (OUTPATIENT)
Dept: PRIMARY CARE | Facility: CLINIC | Age: 79
End: 2024-10-31

## 2024-10-31 ENCOUNTER — APPOINTMENT (OUTPATIENT)
Dept: RADIOLOGY | Facility: HOSPITAL | Age: 79
End: 2024-10-31
Payer: MEDICARE

## 2024-10-31 ENCOUNTER — HOSPITAL ENCOUNTER (OUTPATIENT)
Dept: RADIOLOGY | Facility: CLINIC | Age: 79
Discharge: HOME | End: 2024-10-31
Payer: MEDICARE

## 2024-10-31 ENCOUNTER — HOSPITAL ENCOUNTER (EMERGENCY)
Facility: HOSPITAL | Age: 79
Discharge: HOME | End: 2024-10-31
Attending: STUDENT IN AN ORGANIZED HEALTH CARE EDUCATION/TRAINING PROGRAM
Payer: MEDICARE

## 2024-10-31 VITALS
BODY MASS INDEX: 26.14 KG/M2 | TEMPERATURE: 97.5 F | SYSTOLIC BLOOD PRESSURE: 164 MMHG | OXYGEN SATURATION: 99 % | DIASTOLIC BLOOD PRESSURE: 91 MMHG | WEIGHT: 177 LBS | RESPIRATION RATE: 16 BRPM | HEART RATE: 65 BPM

## 2024-10-31 DIAGNOSIS — R33.9 URINARY RETENTION: ICD-10-CM

## 2024-10-31 DIAGNOSIS — N17.9 AKI (ACUTE KIDNEY INJURY) (CMS-HCC): Primary | ICD-10-CM

## 2024-10-31 DIAGNOSIS — N17.9 AKI (ACUTE KIDNEY INJURY) (CMS-HCC): ICD-10-CM

## 2024-10-31 LAB
ALBUMIN SERPL BCP-MCNC: 4.1 G/DL (ref 3.4–5)
ALP SERPL-CCNC: 70 U/L (ref 33–136)
ALT SERPL W P-5'-P-CCNC: 9 U/L (ref 10–52)
ANION GAP SERPL CALC-SCNC: 13 MMOL/L (ref 10–20)
APPEARANCE UR: CLEAR
AST SERPL W P-5'-P-CCNC: 14 U/L (ref 9–39)
BASOPHILS # BLD AUTO: 0.02 X10*3/UL (ref 0–0.1)
BASOPHILS NFR BLD AUTO: 0.5 %
BILIRUB SERPL-MCNC: 0.6 MG/DL (ref 0–1.2)
BILIRUB UR STRIP.AUTO-MCNC: NEGATIVE MG/DL
BUN SERPL-MCNC: 37 MG/DL (ref 6–23)
CALCIUM SERPL-MCNC: 9.1 MG/DL (ref 8.6–10.3)
CHLORIDE SERPL-SCNC: 107 MMOL/L (ref 98–107)
CO2 SERPL-SCNC: 26 MMOL/L (ref 21–32)
COLOR UR: NORMAL
CREAT SERPL-MCNC: 2.24 MG/DL (ref 0.5–1.3)
EGFRCR SERPLBLD CKD-EPI 2021: 29 ML/MIN/1.73M*2
EOSINOPHIL # BLD AUTO: 0.06 X10*3/UL (ref 0–0.4)
EOSINOPHIL NFR BLD AUTO: 1.4 %
ERYTHROCYTE [DISTWIDTH] IN BLOOD BY AUTOMATED COUNT: 12.9 % (ref 11.5–14.5)
GLUCOSE SERPL-MCNC: 127 MG/DL (ref 74–99)
GLUCOSE UR STRIP.AUTO-MCNC: NORMAL MG/DL
HCT VFR BLD AUTO: 35.5 % (ref 41–52)
HGB BLD-MCNC: 11.9 G/DL (ref 13.5–17.5)
IMM GRANULOCYTES # BLD AUTO: 0.01 X10*3/UL (ref 0–0.5)
IMM GRANULOCYTES NFR BLD AUTO: 0.2 % (ref 0–0.9)
KETONES UR STRIP.AUTO-MCNC: NEGATIVE MG/DL
LEUKOCYTE ESTERASE UR QL STRIP.AUTO: NEGATIVE
LYMPHOCYTES # BLD AUTO: 1.14 X10*3/UL (ref 0.8–3)
LYMPHOCYTES NFR BLD AUTO: 26.9 %
MCH RBC QN AUTO: 34.7 PG (ref 26–34)
MCHC RBC AUTO-ENTMCNC: 33.5 G/DL (ref 32–36)
MCV RBC AUTO: 104 FL (ref 80–100)
MONOCYTES # BLD AUTO: 0.39 X10*3/UL (ref 0.05–0.8)
MONOCYTES NFR BLD AUTO: 9.2 %
NEUTROPHILS # BLD AUTO: 2.62 X10*3/UL (ref 1.6–5.5)
NEUTROPHILS NFR BLD AUTO: 61.8 %
NITRITE UR QL STRIP.AUTO: NEGATIVE
NRBC BLD-RTO: 0 /100 WBCS (ref 0–0)
PH UR STRIP.AUTO: 6 [PH]
PLATELET # BLD AUTO: 110 X10*3/UL (ref 150–450)
POTASSIUM SERPL-SCNC: 4.2 MMOL/L (ref 3.5–5.3)
PROT SERPL-MCNC: 6.8 G/DL (ref 6.4–8.2)
PROT UR STRIP.AUTO-MCNC: NEGATIVE MG/DL
RBC # BLD AUTO: 3.43 X10*6/UL (ref 4.5–5.9)
RBC # UR STRIP.AUTO: NEGATIVE /UL
SODIUM SERPL-SCNC: 142 MMOL/L (ref 136–145)
SP GR UR STRIP.AUTO: 1.01
UROBILINOGEN UR STRIP.AUTO-MCNC: NORMAL MG/DL
WBC # BLD AUTO: 4.2 X10*3/UL (ref 4.4–11.3)

## 2024-10-31 PROCEDURE — 51702 INSERT TEMP BLADDER CATH: CPT

## 2024-10-31 PROCEDURE — 81003 URINALYSIS AUTO W/O SCOPE: CPT | Performed by: NURSE PRACTITIONER

## 2024-10-31 PROCEDURE — 74176 CT ABD & PELVIS W/O CONTRAST: CPT

## 2024-10-31 PROCEDURE — 74176 CT ABD & PELVIS W/O CONTRAST: CPT | Performed by: RADIOLOGY

## 2024-10-31 PROCEDURE — 76770 US EXAM ABDO BACK WALL COMP: CPT

## 2024-10-31 PROCEDURE — 51798 US URINE CAPACITY MEASURE: CPT

## 2024-10-31 PROCEDURE — 99285 EMERGENCY DEPT VISIT HI MDM: CPT | Mod: 25

## 2024-10-31 PROCEDURE — 85025 COMPLETE CBC W/AUTO DIFF WBC: CPT | Performed by: NURSE PRACTITIONER

## 2024-10-31 PROCEDURE — 76770 US EXAM ABDO BACK WALL COMP: CPT | Performed by: RADIOLOGY

## 2024-10-31 PROCEDURE — 36415 COLL VENOUS BLD VENIPUNCTURE: CPT | Performed by: NURSE PRACTITIONER

## 2024-10-31 PROCEDURE — 80053 COMPREHEN METABOLIC PANEL: CPT | Performed by: NURSE PRACTITIONER

## 2024-10-31 ASSESSMENT — COLUMBIA-SUICIDE SEVERITY RATING SCALE - C-SSRS
2. HAVE YOU ACTUALLY HAD ANY THOUGHTS OF KILLING YOURSELF?: NO
1. IN THE PAST MONTH, HAVE YOU WISHED YOU WERE DEAD OR WISHED YOU COULD GO TO SLEEP AND NOT WAKE UP?: NO
1. IN THE PAST MONTH, HAVE YOU WISHED YOU WERE DEAD OR WISHED YOU COULD GO TO SLEEP AND NOT WAKE UP?: NO
6. HAVE YOU EVER DONE ANYTHING, STARTED TO DO ANYTHING, OR PREPARED TO DO ANYTHING TO END YOUR LIFE?: NO
2. HAVE YOU ACTUALLY HAD ANY THOUGHTS OF KILLING YOURSELF?: NO
6. HAVE YOU EVER DONE ANYTHING, STARTED TO DO ANYTHING, OR PREPARED TO DO ANYTHING TO END YOUR LIFE?: NO

## 2024-11-01 ENCOUNTER — TELEPHONE (OUTPATIENT)
Dept: UROLOGY | Facility: CLINIC | Age: 79
End: 2024-11-01
Payer: MEDICARE

## 2024-11-01 LAB — HOLD SPECIMEN: NORMAL

## 2024-11-03 NOTE — PROGRESS NOTES
Subjective   Patient ID: Ricci Granados is a 79 y.o. male PRESENTS FOR A F/U AFTER HE WAS SEEN IN THE ER ON 10-31-24 FOR URINARY RETENTION.  PT FOUND TO HAVE OVER ONE LITER OF URINE IN THE BLADDER.  A SAUCEDA WAS PLACED AND HE NOW PRESENTS TO DISCUSS OPTIONS OF FURTHER THERAPY.      Review of Systems  General-- No C/O fever or chills  Head-- No C/O Dizziness  Eyes-- NO  C/O blurry or double vision  Ears-- No C/O hearing loss  Neck-- Supple  Chest-- No C/O pain or discomfort  Lungs-- No C/O shortness of breath  Abdomen-- No C/O  pain or discomfort, No nausea or vomiting  Back-- No C/O back pain or discomfort  Extremities-- No C/O swelling or pain    Objective   Physical Exam    General-- well developed, well nourished in NAD  Head-- normal cephalic, atraumatic  Eyes-- PERRL, EOM'S FROM,  no  jaundice  Neck-- Supple, without masses  Chest-- Normal bony structure  Abdomen-- soft, non tender, liver spleen not palpable . No supra pubic masses, RECTUS DIASTASES, SMALL UMBILICAL HERNIA   Back-- no flank masses palpable, no CVA tenderness on palpation or perc;ussion  Lymph nodes-- No inguinal lymphadenopathy noted  Prostate-- 2+, firm, smooth, non tender,without nodules  Testis-- both down, non tender, without masses  Epididymis-- no masses palpable  Scrotum -- no hydrocele noted  PENIS -- SAUCEDA PRESENT -- ATTACHED TO A LEG BAG  Extremities -- Normal muscle mass and tone for the patients age  Neurological-- oriented times three    10-31-24  RENAL U/S:  IMPRESSION:  Urinary retention with marked distention of the urinary bladder, with  significant postvoid residual greater than 1 L, and severe bilateral  hydronephrosis. Echogenic renal cortices suggests chronic medical  renal disease as well. Prostatomegaly.    10-31-24  CAT SCAN OF THE ABD AND PELVIS:  IMPRESSION:  Interval decompression of the urinary bladder with Sauceda placement as  well as intraluminal air likely post procedural. Wall thickening of  the bladder may be  related to persistent outlet obstruction however  superimposed cystitis not excluded. There is persistent severe  bilateral hydroureteronephrosis, with renal atrophy, to the level of  the ureterovesicular junction. No obstructing calculus identified.  Obstructing stricture or mass not excluded. Follow-up to resolution  recommended.    Prostatomegaly. Correlation with PSA is suggested.    PSA:  9-26-24-- 7.07  9-18-23-- 4.2    Assessment/Plan   A:  H/O URINARY RETENTION-- GREATER THAN ONE LITER OF URINE IN THE BLADDER WHEN A SAUCEDA WAS PLACED  BPH CAUSING BLADDER OUTLET OBSTRUCTION  ELEVATED PSA? SECONDARY TO THE ABOVE RATHER THAN PROSTATE CANCER    RENAL FAILURE SECONDARY TO THE ABOVE-- CREATININE 2.4 ON 10-31-24  PATHOPHYSIOLOGY OF THE ABOVE AND OPTIONS OF FURTHER EVALUATION DISCUSSED IN DETAIL WITH THE PT AND HIS WIFE  ALL QUESTIONS ANSWERED  P:  SAUCEDA TO CD FOR NOW TO CONTINUE TO DECOMPRESS THE KIDNEYS  BEGIN :  FLOMAX 0.4 MG / DAY   TRIAL OF VOIDING  NEXT WEDS  BILATERAL RENAL U/S AND BMP NEXT MONDAY  F/U NEXT WEDS  AFTERNOON TO CK A PVR AND TO PLAN  FURTHER THERAPY  Cristi Bear MD 11/03/24 7:48 AM

## 2024-11-04 ENCOUNTER — OFFICE VISIT (OUTPATIENT)
Dept: UROLOGY | Facility: CLINIC | Age: 79
End: 2024-11-04
Payer: MEDICARE

## 2024-11-04 VITALS
SYSTOLIC BLOOD PRESSURE: 159 MMHG | BODY MASS INDEX: 26.83 KG/M2 | HEIGHT: 68 IN | HEART RATE: 79 BPM | WEIGHT: 177 LBS | DIASTOLIC BLOOD PRESSURE: 79 MMHG | RESPIRATION RATE: 18 BRPM | TEMPERATURE: 98.6 F

## 2024-11-04 DIAGNOSIS — R97.20 ELEVATED PSA: ICD-10-CM

## 2024-11-04 DIAGNOSIS — N17.9 ACUTE RENAL FAILURE SUPERIMPOSED ON CHRONIC KIDNEY DISEASE, UNSPECIFIED ACUTE RENAL FAILURE TYPE, UNSPECIFIED CKD STAGE (CMS-HCC): ICD-10-CM

## 2024-11-04 DIAGNOSIS — N40.1 BENIGN PROSTATIC HYPERPLASIA WITH INCOMPLETE BLADDER EMPTYING: ICD-10-CM

## 2024-11-04 DIAGNOSIS — R33.9 URINARY RETENTION: Primary | ICD-10-CM

## 2024-11-04 DIAGNOSIS — N18.9 ACUTE RENAL FAILURE SUPERIMPOSED ON CHRONIC KIDNEY DISEASE, UNSPECIFIED ACUTE RENAL FAILURE TYPE, UNSPECIFIED CKD STAGE (CMS-HCC): ICD-10-CM

## 2024-11-04 DIAGNOSIS — R39.14 BENIGN PROSTATIC HYPERPLASIA WITH INCOMPLETE BLADDER EMPTYING: ICD-10-CM

## 2024-11-04 PROCEDURE — 99214 OFFICE O/P EST MOD 30 MIN: CPT | Performed by: UROLOGY

## 2024-11-04 PROCEDURE — 1159F MED LIST DOCD IN RCRD: CPT | Performed by: UROLOGY

## 2024-11-04 PROCEDURE — 3078F DIAST BP <80 MM HG: CPT | Performed by: UROLOGY

## 2024-11-04 PROCEDURE — 1123F ACP DISCUSS/DSCN MKR DOCD: CPT | Performed by: UROLOGY

## 2024-11-04 PROCEDURE — 99204 OFFICE O/P NEW MOD 45 MIN: CPT | Performed by: UROLOGY

## 2024-11-04 PROCEDURE — 3077F SYST BP >= 140 MM HG: CPT | Performed by: UROLOGY

## 2024-11-04 PROCEDURE — 1036F TOBACCO NON-USER: CPT | Performed by: UROLOGY

## 2024-11-04 PROCEDURE — 1160F RVW MEDS BY RX/DR IN RCRD: CPT | Performed by: UROLOGY

## 2024-11-04 RX ORDER — TAMSULOSIN HYDROCHLORIDE 0.4 MG/1
0.4 CAPSULE ORAL DAILY
Qty: 30 CAPSULE | Refills: 11 | Status: SHIPPED | OUTPATIENT
Start: 2024-11-04 | End: 2025-11-04

## 2024-11-04 RX ORDER — BISMUTH SUBSALICYLATE 262 MG
1 TABLET,CHEWABLE ORAL DAILY
COMMUNITY

## 2024-11-04 RX ORDER — VIT C/E/ZN/COPPR/LUTEIN/ZEAXAN 250MG-90MG
400 CAPSULE ORAL DAILY
COMMUNITY

## 2024-11-04 SDOH — ECONOMIC STABILITY: FOOD INSECURITY: WITHIN THE PAST 12 MONTHS, THE FOOD YOU BOUGHT JUST DIDN'T LAST AND YOU DIDN'T HAVE MONEY TO GET MORE.: NEVER TRUE

## 2024-11-04 SDOH — ECONOMIC STABILITY: FOOD INSECURITY: WITHIN THE PAST 12 MONTHS, YOU WORRIED THAT YOUR FOOD WOULD RUN OUT BEFORE YOU GOT MONEY TO BUY MORE.: NEVER TRUE

## 2024-11-04 ASSESSMENT — LIFESTYLE VARIABLES
HOW OFTEN DO YOU HAVE SIX OR MORE DRINKS ON ONE OCCASION: NEVER
HOW MANY STANDARD DRINKS CONTAINING ALCOHOL DO YOU HAVE ON A TYPICAL DAY: PATIENT DOES NOT DRINK
HOW OFTEN DO YOU HAVE A DRINK CONTAINING ALCOHOL: NEVER
AUDIT-C TOTAL SCORE: 0
SKIP TO QUESTIONS 9-10: 1

## 2024-11-04 ASSESSMENT — ENCOUNTER SYMPTOMS
DEPRESSION: 0
OCCASIONAL FEELINGS OF UNSTEADINESS: 0
LOSS OF SENSATION IN FEET: 0

## 2024-11-04 NOTE — LETTER
November 4, 2024     Evaristo Tijerina MD  [address]  [address]  [facility] [address]    Patient: Ricci Granados   YOB: 1945   Date of Visit: 11/4/2024       Dear Dr. Evaristo Tijerina MD:    Thank you for referring Ricci Granados to me for evaluation. Below are my notes for this consultation.  If you have questions, please do not hesitate to call me. I look forward to following your patient along with you.       Sincerely,     Cristi Bear MD      CC: No Recipients  ______________________________________________________________________________________    Subjective  Patient ID: Ricci Granados is a 79 y.o. male PRESENTS FOR A F/U AFTER HE WAS SEEN IN THE ER ON 10-31-24 FOR URINARY RETENTION.  PT FOUND TO HAVE OVER ONE LITER OF URINE IN THE BLADDER.  A SAUCEDA WAS PLACED AND HE NOW PRESENTS TO DISCUSS OPTIONS OF FURTHER THERAPY.      Review of Systems  General-- No C/O fever or chills  Head-- No C/O Dizziness  Eyes-- NO  C/O blurry or double vision  Ears-- No C/O hearing loss  Neck-- Supple  Chest-- No C/O pain or discomfort  Lungs-- No C/O shortness of breath  Abdomen-- No C/O  pain or discomfort, No nausea or vomiting  Back-- No C/O back pain or discomfort  Extremities-- No C/O swelling or pain    Objective   Physical Exam    General-- well developed, well nourished in NAD  Head-- normal cephalic, atraumatic  Eyes-- PERRL, EOM'S FROM,  no  jaundice  Neck-- Supple, without masses  Chest-- Normal bony structure  Abdomen-- soft, non tender, liver spleen not palpable . No supra pubic masses, RECTUS DIASTASES, SMALL UMBILICAL HERNIA   Back-- no flank masses palpable, no CVA tenderness on palpation or perc;ussion  Lymph nodes-- No inguinal lymphadenopathy noted  Prostate-- 2+, firm, smooth, non tender,without nodules  Testis-- both down, non tender, without masses  Epididymis-- no masses palpable  Scrotum -- no hydrocele noted  PENIS -- SAUCEDA PRESENT -- ATTACHED TO A LEG BAG  Extremities -- Normal  muscle mass and tone for the patients age  Neurological-- oriented times three    10-31-24  RENAL U/S:  IMPRESSION:  Urinary retention with marked distention of the urinary bladder, with  significant postvoid residual greater than 1 L, and severe bilateral  hydronephrosis. Echogenic renal cortices suggests chronic medical  renal disease as well. Prostatomegaly.    10-31-24  CAT SCAN OF THE ABD AND PELVIS:  IMPRESSION:  Interval decompression of the urinary bladder with Sauceda placement as  well as intraluminal air likely post procedural. Wall thickening of  the bladder may be related to persistent outlet obstruction however  superimposed cystitis not excluded. There is persistent severe  bilateral hydroureteronephrosis, with renal atrophy, to the level of  the ureterovesicular junction. No obstructing calculus identified.  Obstructing stricture or mass not excluded. Follow-up to resolution  recommended.    Prostatomegaly. Correlation with PSA is suggested.    PSA:  9-26-24-- 7.07  9-18-23-- 4.2    Assessment/Plan   A:  H/O URINARY RETENTION-- GREATER THAN ONE LITER OF URINE IN THE BLADDER WHEN A SAUCEDA WAS PLACED  BPH CAUSING BLADDER OUTLET OBSTRUCTION  ELEVATED PSA? SECONDARY TO THE ABOVE RATHER THAN PROSTATE CANCER    RENAL FAILURE SECONDARY TO THE ABOVE-- CREATININE 2.4 ON 10-31-24  PATHOPHYSIOLOGY OF THE ABOVE AND OPTIONS OF FURTHER EVALUATION DISCUSSED IN DETAIL WITH THE PT AND HIS WIFE  ALL QUESTIONS ANSWERED  P:  SAUCEDA TO CD FOR NOW TO CONTINUE TO DECOMPRESS THE KIDNEYS  BEGIN :  FLOMAX 0.4 MG / DAY   TRIAL OF VOIDING  NEXT WEDS  BILATERAL RENAL U/S AND BMP NEXT MONDAY  F/U NEXT WEDS  AFTERNOON TO CK A PVR AND TO PLAN  FURTHER THERAPY  Cristi Bear MD 11/03/24 7:48 AM

## 2024-11-05 ENCOUNTER — TELEPHONE (OUTPATIENT)
Dept: PRIMARY CARE | Facility: CLINIC | Age: 79
End: 2024-11-05
Payer: MEDICARE

## 2024-11-05 NOTE — TELEPHONE ENCOUNTER
Ricci's wife Arleen called to let you know he just saw Dr. Bear, he has a catheter, and will be following up him. Wanted to let you know so you can see the visits in his chart.

## 2024-11-10 NOTE — PROGRESS NOTES
Omeprazole script e-scribed to patient's [facility] pharmacy.    Subjective   Patient ID: Ricci Granados is a 79 y.o. male who presents for      Assessment/Plan   A:  H/O URINARY RETENTION-- GREATER THAN ONE LITER OF URINE IN THE BLADDER WHEN A SAUCEDA WAS PLACED  BPH CAUSING BLADDER OUTLET OBSTRUCTION  ELEVATED PSA? SECONDARY TO THE ABOVE RATHER THAN PROSTATE CANCER     RENAL FAILURE SECONDARY TO THE ABOVE-- CREATININE 2.4 ON 10-31-24  PATHOPHYSIOLOGY OF THE ABOVE AND OPTIONS OF FURTHER EVALUATION DISCUSSED IN DETAIL WITH THE PT AND HIS WIFE  ALL QUESTIONS ANSWERED  P:  SAUCEDA TO CD FOR NOW TO CONTINUE TO DECOMPRESS THE KIDNEYS  BEGIN :  FLOMAX 0.4 MG / DAY   TRIAL OF VOIDING  NEXT WEDS  BILATERAL RENAL U/S AND BMP NEXT MONDAY  F/U NEXT WEDS  AFTERNOON TO CK A PVR AND TO PLAN  FURTHER THERAPY  Cristi Bear MD 11/10/24 3:43 PM

## 2024-11-11 ENCOUNTER — HOSPITAL ENCOUNTER (OUTPATIENT)
Dept: RADIOLOGY | Facility: CLINIC | Age: 79
Discharge: HOME | End: 2024-11-11
Payer: MEDICARE

## 2024-11-11 ENCOUNTER — TELEPHONE (OUTPATIENT)
Dept: UROLOGY | Facility: CLINIC | Age: 79
End: 2024-11-11

## 2024-11-11 ENCOUNTER — OFFICE VISIT (OUTPATIENT)
Dept: UROLOGY | Facility: CLINIC | Age: 79
End: 2024-11-11
Payer: MEDICARE

## 2024-11-11 ENCOUNTER — LAB (OUTPATIENT)
Dept: LAB | Facility: LAB | Age: 79
End: 2024-11-11
Payer: MEDICARE

## 2024-11-11 VITALS
DIASTOLIC BLOOD PRESSURE: 79 MMHG | WEIGHT: 162 LBS | BODY MASS INDEX: 23.99 KG/M2 | HEIGHT: 69 IN | HEART RATE: 84 BPM | SYSTOLIC BLOOD PRESSURE: 153 MMHG

## 2024-11-11 DIAGNOSIS — R33.9 RETENTION OF URINE: ICD-10-CM

## 2024-11-11 DIAGNOSIS — R33.9 URINARY RETENTION: ICD-10-CM

## 2024-11-11 DIAGNOSIS — N40.1 BENIGN PROSTATIC HYPERPLASIA WITH INCOMPLETE BLADDER EMPTYING: ICD-10-CM

## 2024-11-11 DIAGNOSIS — N31.2 ATONIC NEUROGENIC BLADDER: Primary | ICD-10-CM

## 2024-11-11 DIAGNOSIS — R39.14 BENIGN PROSTATIC HYPERPLASIA WITH INCOMPLETE BLADDER EMPTYING: ICD-10-CM

## 2024-11-11 DIAGNOSIS — R39.198 DIFFICULTY URINATING: ICD-10-CM

## 2024-11-11 LAB
ANION GAP SERPL CALC-SCNC: 13 MMOL/L (ref 10–20)
BUN SERPL-MCNC: 28 MG/DL (ref 6–23)
CALCIUM SERPL-MCNC: 9.2 MG/DL (ref 8.6–10.6)
CHLORIDE SERPL-SCNC: 103 MMOL/L (ref 98–107)
CO2 SERPL-SCNC: 28 MMOL/L (ref 21–32)
CREAT SERPL-MCNC: 1.85 MG/DL (ref 0.5–1.3)
EGFRCR SERPLBLD CKD-EPI 2021: 37 ML/MIN/1.73M*2
GLUCOSE SERPL-MCNC: 104 MG/DL (ref 74–99)
POTASSIUM SERPL-SCNC: 4.3 MMOL/L (ref 3.5–5.3)
SODIUM SERPL-SCNC: 140 MMOL/L (ref 136–145)

## 2024-11-11 PROCEDURE — 1159F MED LIST DOCD IN RCRD: CPT | Performed by: UROLOGY

## 2024-11-11 PROCEDURE — 36415 COLL VENOUS BLD VENIPUNCTURE: CPT

## 2024-11-11 PROCEDURE — 99213 OFFICE O/P EST LOW 20 MIN: CPT | Performed by: UROLOGY

## 2024-11-11 PROCEDURE — 80048 BASIC METABOLIC PNL TOTAL CA: CPT

## 2024-11-11 PROCEDURE — 1036F TOBACCO NON-USER: CPT | Performed by: UROLOGY

## 2024-11-11 PROCEDURE — 3078F DIAST BP <80 MM HG: CPT | Performed by: UROLOGY

## 2024-11-11 PROCEDURE — 1160F RVW MEDS BY RX/DR IN RCRD: CPT | Performed by: UROLOGY

## 2024-11-11 PROCEDURE — 51702 INSERT TEMP BLADDER CATH: CPT | Performed by: UROLOGY

## 2024-11-11 PROCEDURE — 3077F SYST BP >= 140 MM HG: CPT | Performed by: UROLOGY

## 2024-11-11 PROCEDURE — 1123F ACP DISCUSS/DSCN MKR DOCD: CPT | Performed by: UROLOGY

## 2024-11-11 PROCEDURE — 76770 US EXAM ABDO BACK WALL COMP: CPT | Performed by: STUDENT IN AN ORGANIZED HEALTH CARE EDUCATION/TRAINING PROGRAM

## 2024-11-11 PROCEDURE — 76770 US EXAM ABDO BACK WALL COMP: CPT

## 2024-11-11 NOTE — LETTER
November 13, 2024     Evaristo Tijerina MD  [address]  [address]  [facility] [address]    Patient: Ricci Granados   YOB: 1945   Date of Visit: 11/11/2024       Dear Dr. Evaristo Tijerina MD:    Thank you for referring Ricci Granados to me for evaluation. Below are my notes for this consultation.  If you have questions, please do not hesitate to call me. I look forward to following your patient along with you.       Sincerely,     Cristi Bear MD      CC: No Recipients  ______________________________________________________________________________________    Subjective  Patient ID: Ricci Granados is a 79 y.o. male who presents for  A FOLLOW UP AFTER REMOVING HIS SAUCEDA CATHETER THIS AM. PT HAS NOT VOIDED  YET.  NO C/O S-P PRESSURE OR DISCOMFORT.   Review of Systems  General-- No C/O fever or chills  Head-- No C/O Dizziness  Eyes-- NO  C/O blurry or double vision  Ears-- No C/O hearing loss  Neck-- Supple  Chest-- No C/O pain or discomfort  Lungs-- No C/O shortness of breath  Abdomen-- No C/O  pain or discomfort, No nausea or vomiting  Back-- No C/O back pain or discomfort  Extremities-- No C/O swelling or pain    Objective   Physical Exam    General-- well developed, well nourished in NAD  Head-- normal cephalic, atraumatic  Eyes-- PERRL, EOM'S FROM,  no  jaundice  Neck-- Supple, without masses  Chest-- Normal bony structure  Abdomen-- soft, non tender, liver spleen not palpable . 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   SCHEDULE: CYSTO, TRUSP  IN THE  DISTANT FUTURE   WILL NEED TO TEACH THE PT HOW TO DO ISC WHEN HIS # 14 FARIDEH LOW FRIC CATHETERS ARRIVE.    JARRELL NICK MD  11-11-24

## 2024-11-11 NOTE — TELEPHONE ENCOUNTER
Patient called expressing concerns that he is unable to void after removing catheter this morning for ultrasound. Appointment scheduled today (11/11) to see Dr. Bear.

## 2024-11-11 NOTE — PROGRESS NOTES
Subjective   Patient ID: Ricci Granados is a 79 y.o. male who presents for  A FOLLOW UP AFTER REMOVING HIS SAUCEDA CATHETER THIS AM. PT HAS NOT VOIDED  YET.  NO C/O S-P PRESSURE OR DISCOMFORT.   Review of Systems  General-- No C/O fever or chills  Head-- No C/O Dizziness  Eyes-- NO  C/O blurry or double vision  Ears-- No C/O hearing loss  Neck-- Supple  Chest-- No C/O pain or discomfort  Lungs-- No C/O shortness of breath  Abdomen-- No C/O  pain or discomfort, No nausea or vomiting  Back-- No C/O back pain or discomfort  Extremities-- No C/O swelling or pain    Objective   Physical Exam    General-- well developed, well nourished in NAD  Head-- normal cephalic, atraumatic  Eyes-- PERRL, EOM'S FROM,  no  jaundice  Neck-- Supple, without masses  Chest-- Normal bony structure  Abdomen-- soft, non tender, liver spleen not palpable . 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   SCHEDULE: CYSTO, TRUSP  IN THE  DISTANT FUTURE   WILL NEED TO TEACH THE PT HOW TO DO ISC WHEN HIS # 14 FARIDEH LOW FRIC CATHETERS ARRIVE.    JARRELL NICK MD  11-11-24

## 2024-11-13 ENCOUNTER — APPOINTMENT (OUTPATIENT)
Dept: UROLOGY | Facility: CLINIC | Age: 79
End: 2024-11-13
Payer: MEDICARE

## 2024-11-17 NOTE — PROGRESS NOTES
Subjective   Patient ID: Ricci Granados is a 79 y.o. male who presents for A F/U AFTER HAVING A SAUCEDA PLACED ON 11-11-24 FOR A PROBABLE SENSORY,MOTOR NEUROGENIC BLADDER.  SAUCEDA WAS REMOVED THIS AM.  PT PRESENTS TO LEARN INTERMITTENT SELF CATH.   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   SCHEDULE: CYSTO, TRUSP  IN THE  DISTANT FUTURE   BMP TO CK HIS CREATININE   F/U IN 2 WEEKS TO SEE HOW HE IS DOING AND TO CK HIS BMP    Cristi Bear MD 11/17/24 12:57 PM

## 2024-11-20 ENCOUNTER — OFFICE VISIT (OUTPATIENT)
Dept: UROLOGY | Facility: CLINIC | Age: 79
End: 2024-11-20
Payer: MEDICARE

## 2024-11-20 VITALS
RESPIRATION RATE: 16 BRPM | HEIGHT: 69 IN | BODY MASS INDEX: 23.99 KG/M2 | HEART RATE: 70 BPM | SYSTOLIC BLOOD PRESSURE: 146 MMHG | TEMPERATURE: 98.1 F | DIASTOLIC BLOOD PRESSURE: 80 MMHG | WEIGHT: 162 LBS

## 2024-11-20 DIAGNOSIS — N30.90 CYSTITIS: ICD-10-CM

## 2024-11-20 DIAGNOSIS — R33.9 URINARY RETENTION: Primary | ICD-10-CM

## 2024-11-20 DIAGNOSIS — N31.2 FLACCID NEUROGENIC BLADDER: ICD-10-CM

## 2024-11-20 DIAGNOSIS — R97.20 ELEVATED PSA: ICD-10-CM

## 2024-11-20 LAB
APPEARANCE UR: ABNORMAL
BACTERIA #/AREA URNS AUTO: ABNORMAL /HPF
BILIRUB UR STRIP.AUTO-MCNC: NEGATIVE MG/DL
COLOR UR: YELLOW
GLUCOSE UR STRIP.AUTO-MCNC: NORMAL MG/DL
KETONES UR STRIP.AUTO-MCNC: NEGATIVE MG/DL
LEUKOCYTE ESTERASE UR QL STRIP.AUTO: ABNORMAL
NITRITE UR QL STRIP.AUTO: NEGATIVE
PH UR STRIP.AUTO: 5.5 [PH]
PROT UR STRIP.AUTO-MCNC: ABNORMAL MG/DL
RBC # UR STRIP.AUTO: ABNORMAL /UL
RBC #/AREA URNS AUTO: >20 /HPF
SP GR UR STRIP.AUTO: 1.01
UROBILINOGEN UR STRIP.AUTO-MCNC: NORMAL MG/DL
WBC #/AREA URNS AUTO: >50 /HPF
WBC CLUMPS #/AREA URNS AUTO: ABNORMAL /HPF

## 2024-11-20 PROCEDURE — 1126F AMNT PAIN NOTED NONE PRSNT: CPT | Performed by: UROLOGY

## 2024-11-20 PROCEDURE — 99213 OFFICE O/P EST LOW 20 MIN: CPT | Performed by: UROLOGY

## 2024-11-20 PROCEDURE — 3079F DIAST BP 80-89 MM HG: CPT | Performed by: UROLOGY

## 2024-11-20 PROCEDURE — 87086 URINE CULTURE/COLONY COUNT: CPT | Mod: PARLAB | Performed by: UROLOGY

## 2024-11-20 PROCEDURE — 81001 URINALYSIS AUTO W/SCOPE: CPT | Performed by: UROLOGY

## 2024-11-20 PROCEDURE — 1036F TOBACCO NON-USER: CPT | Performed by: UROLOGY

## 2024-11-20 PROCEDURE — 1123F ACP DISCUSS/DSCN MKR DOCD: CPT | Performed by: UROLOGY

## 2024-11-20 PROCEDURE — 3077F SYST BP >= 140 MM HG: CPT | Performed by: UROLOGY

## 2024-11-20 PROCEDURE — 1160F RVW MEDS BY RX/DR IN RCRD: CPT | Performed by: UROLOGY

## 2024-11-20 PROCEDURE — 1159F MED LIST DOCD IN RCRD: CPT | Performed by: UROLOGY

## 2024-11-20 RX ORDER — CEPHALEXIN 250 MG/1
250 CAPSULE ORAL 4 TIMES DAILY
Qty: 28 CAPSULE | Refills: 0 | Status: SHIPPED | OUTPATIENT
Start: 2024-11-20 | End: 2024-11-27

## 2024-11-20 ASSESSMENT — ENCOUNTER SYMPTOMS
LOSS OF SENSATION IN FEET: 0
OCCASIONAL FEELINGS OF UNSTEADINESS: 0
DEPRESSION: 0

## 2024-11-20 ASSESSMENT — PAIN SCALES - GENERAL: PAINLEVEL_OUTOF10: 0-NO PAIN

## 2024-11-20 NOTE — LETTER
November 20, 2024     Evaristo Tijerina MD  [address] [facility]  [address]  [facility] [address]    Patient: Ricci Granados   YOB: 1945   Date of Visit: 11/20/2024       Dear Dr. Evaristo Tijerina MD:    Thank you for referring Ricci Granados to me for evaluation. Below are my notes for this consultation.  If you have questions, please do not hesitate to call me. I look forward to following your patient along with you.       Sincerely,     Cristi Bear MD      CC: No Recipients  ______________________________________________________________________________________    Subjective  Patient ID: Ricci Granados is a 79 y.o. male who presents for A F/U AFTER HAVING A SAUCEDA PLACED ON 11-11-24 FOR A PROBABLE SENSORY,MOTOR NEUROGENIC BLADDER.  SAUCEDA WAS REMOVED THIS AM.  PT PRESENTS TO LEARN INTERMITTENT SELF CATH.   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   SCHEDULE: CYSTO, TRUSP  IN THE  DISTANT FUTURE   BMP TO CK HIS CREATININE   F/U IN 2 WEEKS TO SEE HOW HE IS DOING AND TO CK HIS BMP    Cristi Bear MD 11/17/24 12:57 PM

## 2024-11-21 LAB — HOLD SPECIMEN: NORMAL

## 2024-11-23 LAB — BACTERIA UR CULT: ABNORMAL

## 2024-12-02 ENCOUNTER — APPOINTMENT (OUTPATIENT)
Dept: PRIMARY CARE | Facility: CLINIC | Age: 79
End: 2024-12-02
Payer: MEDICARE

## 2024-12-02 VITALS
SYSTOLIC BLOOD PRESSURE: 120 MMHG | RESPIRATION RATE: 14 BRPM | WEIGHT: 165.4 LBS | HEART RATE: 74 BPM | OXYGEN SATURATION: 96 % | DIASTOLIC BLOOD PRESSURE: 68 MMHG | BODY MASS INDEX: 24.43 KG/M2

## 2024-12-02 DIAGNOSIS — I10 BENIGN ESSENTIAL HYPERTENSION: Primary | ICD-10-CM

## 2024-12-02 PROCEDURE — 3078F DIAST BP <80 MM HG: CPT | Performed by: INTERNAL MEDICINE

## 2024-12-02 PROCEDURE — 1159F MED LIST DOCD IN RCRD: CPT | Performed by: INTERNAL MEDICINE

## 2024-12-02 PROCEDURE — 3074F SYST BP LT 130 MM HG: CPT | Performed by: INTERNAL MEDICINE

## 2024-12-02 PROCEDURE — 1123F ACP DISCUSS/DSCN MKR DOCD: CPT | Performed by: INTERNAL MEDICINE

## 2024-12-02 PROCEDURE — 99213 OFFICE O/P EST LOW 20 MIN: CPT | Performed by: INTERNAL MEDICINE

## 2024-12-02 ASSESSMENT — ENCOUNTER SYMPTOMS: FATIGUE: 0

## 2024-12-02 ASSESSMENT — PATIENT HEALTH QUESTIONNAIRE - PHQ9
2. FEELING DOWN, DEPRESSED OR HOPELESS: SEVERAL DAYS
1. LITTLE INTEREST OR PLEASURE IN DOING THINGS: SEVERAL DAYS
SUM OF ALL RESPONSES TO PHQ9 QUESTIONS 1 AND 2: 2

## 2024-12-02 NOTE — ASSESSMENT & PLAN NOTE
-BP controlled on coreg 10mg daily. Was supposed to continue amlodipine but stopped it d/t thinking it was the same as atenolol when we changed atenolol over to coreg.  -Regardless BP stable and can see on home pressures its been improved as well. Possible that with urinary retention addressed that we don't need amlodipine. Will keep off med list and add back if BP rises again (pt and wife know to reach out if BP goes above 130/85 consistently at home).

## 2024-12-02 NOTE — PROGRESS NOTES
Subjective   Patient ID: Ricci Granados is a 79 y.o. male who presents for Follow-up (1 month).    Pt overall feels like he is doing well. Since last appt had jay catheter placed then was removed. Is doing intermittent cathing without issue.        Review of Systems   Constitutional:  Negative for fatigue.   Cardiovascular:  Negative for leg swelling.       /68 (BP Location: Left arm, Patient Position: Sitting)   Pulse 74   Resp 14   Wt 75 kg (165 lb 6.4 oz)   SpO2 96%   BMI 24.43 kg/m²   Objective   Physical Exam  Constitutional:       General: He is not in acute distress.     Appearance: He is not ill-appearing, toxic-appearing or diaphoretic.   HENT:      Head: Normocephalic and atraumatic.   Eyes:      Conjunctiva/sclera: Conjunctivae normal.   Neurological:      Mental Status: He is alert.         Assessment/Plan   Problem List Items Addressed This Visit             ICD-10-CM    Benign essential hypertension - Primary I10     -BP controlled on coreg 10mg daily. Was supposed to continue amlodipine but stopped it d/t thinking it was the same as atenolol when we changed atenolol over to coreg.  -Regardless BP stable and can see on home pressures its been improved as well. Possible that with urinary retention addressed that we don't need amlodipine. Will keep off med list and add back if BP rises again (pt and wife know to reach out if BP goes above 130/85 consistently at home).        -Pt repeating BMP end of this week. If continuing to improve then may be able to cancel nephrology appt he has in January. Will follow up with pt.         Evaristo Tijerina MD 12/02/24 3:49 PM

## 2024-12-04 ENCOUNTER — LAB (OUTPATIENT)
Dept: LAB | Facility: LAB | Age: 79
End: 2024-12-04
Payer: MEDICARE

## 2024-12-04 DIAGNOSIS — R33.9 URINARY RETENTION: ICD-10-CM

## 2024-12-04 LAB
ANION GAP SERPL CALC-SCNC: 12 MMOL/L (ref 10–20)
BUN SERPL-MCNC: 25 MG/DL (ref 6–23)
CALCIUM SERPL-MCNC: 9.2 MG/DL (ref 8.6–10.6)
CHLORIDE SERPL-SCNC: 107 MMOL/L (ref 98–107)
CO2 SERPL-SCNC: 29 MMOL/L (ref 21–32)
CREAT SERPL-MCNC: 1.3 MG/DL (ref 0.5–1.3)
EGFRCR SERPLBLD CKD-EPI 2021: 57 ML/MIN/1.73M*2
GLUCOSE SERPL-MCNC: 88 MG/DL (ref 74–99)
POTASSIUM SERPL-SCNC: 4.7 MMOL/L (ref 3.5–5.3)
SODIUM SERPL-SCNC: 143 MMOL/L (ref 136–145)

## 2024-12-04 PROCEDURE — 36415 COLL VENOUS BLD VENIPUNCTURE: CPT

## 2024-12-04 PROCEDURE — 80048 BASIC METABOLIC PNL TOTAL CA: CPT

## 2024-12-08 NOTE — PROGRESS NOTES
Subjective   Patient ID: Ricci Granados is a 79 y.o. male who presents for  A F/U AFTER LEARNING ISC.  PT IS DOING ISC 3 X / DAY ON THE AVG..  PVR'S   ML. PT IS NOT HAVING ANY TROUBLE PASSING THE CATHETER .      12-4-24  CREATININE-- 1.3    Assessment/Plan   A:  PT IS THOUGHT TO HAVE A SENSORY, MOTOR NEUROGENIC BLADDER SECONDARY TO CHRONIC OVER DISTENTION ? 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    H/O ED  P:  CONTINUE ISC TO KEEP  THE PVR'S UNDER 500 ML  PT WILL NEED TO CATH AT LEAST 6 X / DAY TO KEEP HIS RESIDUALS UNDER 500 ML TO ALLOW DECOMPRESSION OF THE KIDNEYS WHICH WILL HOPEFULLY IMPROVE HIS RENAL FAILURE.  CONTINUE:  FLOMAX 0.4 MG / DAY  SCRIPT FOR KEFLEX GIVEN TO THE PT TO USE PRN SXS OF A UTI IE: FEVER   F/U IN 3 MONTHS TO  ?  SCHEDULE: CYSTO, TRUSP AND CYSTOMETRICS     Cristi Bear MD 12/08/24 1:20 PM

## 2024-12-09 ENCOUNTER — OFFICE VISIT (OUTPATIENT)
Dept: UROLOGY | Facility: CLINIC | Age: 79
End: 2024-12-09
Payer: MEDICARE

## 2024-12-09 VITALS
HEIGHT: 69 IN | HEART RATE: 74 BPM | TEMPERATURE: 98.6 F | RESPIRATION RATE: 16 BRPM | BODY MASS INDEX: 24.44 KG/M2 | WEIGHT: 165 LBS | DIASTOLIC BLOOD PRESSURE: 73 MMHG | OXYGEN SATURATION: 96 % | SYSTOLIC BLOOD PRESSURE: 127 MMHG

## 2024-12-09 DIAGNOSIS — N31.2 NEUROGENIC BLADDER, FLACCID: ICD-10-CM

## 2024-12-09 DIAGNOSIS — N39.0 URINARY TRACT INFECTION WITHOUT HEMATURIA, SITE UNSPECIFIED: Primary | ICD-10-CM

## 2024-12-09 DIAGNOSIS — N40.1 BENIGN PROSTATIC HYPERPLASIA WITH LOWER URINARY TRACT SYMPTOMS, SYMPTOM DETAILS UNSPECIFIED: ICD-10-CM

## 2024-12-09 DIAGNOSIS — R33.9 URINARY RETENTION: ICD-10-CM

## 2024-12-09 PROCEDURE — 1159F MED LIST DOCD IN RCRD: CPT | Performed by: UROLOGY

## 2024-12-09 PROCEDURE — 1126F AMNT PAIN NOTED NONE PRSNT: CPT | Performed by: UROLOGY

## 2024-12-09 PROCEDURE — 99212 OFFICE O/P EST SF 10 MIN: CPT | Performed by: UROLOGY

## 2024-12-09 PROCEDURE — 3074F SYST BP LT 130 MM HG: CPT | Performed by: UROLOGY

## 2024-12-09 PROCEDURE — 1036F TOBACCO NON-USER: CPT | Performed by: UROLOGY

## 2024-12-09 PROCEDURE — 1160F RVW MEDS BY RX/DR IN RCRD: CPT | Performed by: UROLOGY

## 2024-12-09 PROCEDURE — 3078F DIAST BP <80 MM HG: CPT | Performed by: UROLOGY

## 2024-12-09 PROCEDURE — 1123F ACP DISCUSS/DSCN MKR DOCD: CPT | Performed by: UROLOGY

## 2024-12-09 RX ORDER — CEPHALEXIN 250 MG/1
250 CAPSULE ORAL 4 TIMES DAILY
Qty: 28 CAPSULE | Refills: 6 | Status: SHIPPED | OUTPATIENT
Start: 2024-12-09 | End: 2024-12-16

## 2024-12-09 ASSESSMENT — PAIN SCALES - GENERAL: PAINLEVEL_OUTOF10: 0-NO PAIN

## 2024-12-09 ASSESSMENT — ENCOUNTER SYMPTOMS
DEPRESSION: 0
LOSS OF SENSATION IN FEET: 0
OCCASIONAL FEELINGS OF UNSTEADINESS: 0

## 2024-12-09 NOTE — LETTER
December 13, 2024     Evaristo Tijerina MD  [address] [facility]  [address]  [facility] [address]    Patient: Ricci Granados   YOB: 1945   Date of Visit: 12/9/2024       Dear Dr. Evaristo Tijerina MD:    Thank you for referring Ricci Granados to me for evaluation. Below are my notes for this consultation.  If you have questions, please do not hesitate to call me. I look forward to following your patient along with you.       Sincerely,     Cristi Bear MD      CC: No Recipients  ______________________________________________________________________________________    Subjective  Patient ID: Ricci Granados is a 79 y.o. male who presents for  A F/U AFTER LEARNING ISC.  PT IS DOING ISC 3 X / DAY ON THE AVG..  PVR'S   ML. PT IS NOT HAVING ANY TROUBLE PASSING THE CATHETER .      12-4-24  CREATININE-- 1.3    Assessment/Plan   A:  PT IS THOUGHT TO HAVE A SENSORY, MOTOR NEUROGENIC BLADDER SECONDARY TO CHRONIC OVER DISTENTION ? 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    H/O ED  P:  CONTINUE ISC TO KEEP  THE PVR'S UNDER 500 ML  PT WILL NEED TO CATH AT LEAST 6 X / DAY TO KEEP HIS RESIDUALS UNDER 500 ML TO ALLOW DECOMPRESSION OF THE KIDNEYS WHICH WILL HOPEFULLY IMPROVE HIS RENAL FAILURE.  CONTINUE:  FLOMAX 0.4 MG / DAY  SCRIPT FOR KEFLEX GIVEN TO THE PT TO USE PRN SXS OF A UTI IE: FEVER   F/U IN 3 MONTHS TO  ?  SCHEDULE: CYSTO, TRUSP AND CYSTOMETRICS     Cristi Bear MD 12/08/24 1:20 PM

## 2025-03-15 NOTE — PROGRESS NOTES
Subjective   Patient ID: Ricci Granados is a 79 y.o. male who presents for A F/U ON HIS MOTOR NEUROGENIC BLADDER.  PT TAUGHT ISC ON 11-17-24. PT I S CATHING 3 X / DAY -- PVR'S RANGE FROM 250 - 450 ML.   SERUM CREATININE IS DOWN TO 1.2 . PT IS NOT GETTING THE URGE TO VOID YET. PT IS PASSING A VERY LITTLE BIT OF URINE ON HIS OWN.    Assessment/Plan   A:  PT IS THOUGHT TO HAVE A SENSORY, MOTOR NEUROGENIC BLADDER SECONDARY TO CHRONIC OVER DISTENTION ? 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     H/O ED  P:  CONTINUE ISC TO KEEP  THE PVR'S UNDER 500 ML  PT WILL NEED TO CATH AT LEAST 3 X / DAY TO KEEP HIS RESIDUALS UNDER 500 ML TO ALLOW DECOMPRESSION OF THE KIDNEYS WHICH WILL HOPEFULLY IMPROVE HIS RENAL FAILURE.  CONTINUE:  FLOMAX 0.4 MG / DAY  SCRIPT FOR KEFLEX GIVEN TO THE PT TO USE PRN SXS OF A UTI IE: FEVER   F/U IN 3 MONTHS TO  ?  SCHEDULE: CYSTO, TRUSP AND CYSTOMETRICS     Cristi Bear MD 03/15/25 3:14 PM

## 2025-03-17 ENCOUNTER — OFFICE VISIT (OUTPATIENT)
Dept: UROLOGY | Facility: CLINIC | Age: 80
End: 2025-03-17
Payer: MEDICARE

## 2025-03-17 VITALS
SYSTOLIC BLOOD PRESSURE: 150 MMHG | HEIGHT: 69 IN | HEART RATE: 61 BPM | WEIGHT: 163 LBS | BODY MASS INDEX: 24.14 KG/M2 | TEMPERATURE: 98.1 F | DIASTOLIC BLOOD PRESSURE: 81 MMHG

## 2025-03-17 DIAGNOSIS — R33.9 URINARY RETENTION: ICD-10-CM

## 2025-03-17 PROCEDURE — 1160F RVW MEDS BY RX/DR IN RCRD: CPT | Performed by: UROLOGY

## 2025-03-17 PROCEDURE — 1036F TOBACCO NON-USER: CPT | Performed by: UROLOGY

## 2025-03-17 PROCEDURE — 3077F SYST BP >= 140 MM HG: CPT | Performed by: UROLOGY

## 2025-03-17 PROCEDURE — 1123F ACP DISCUSS/DSCN MKR DOCD: CPT | Performed by: UROLOGY

## 2025-03-17 PROCEDURE — 99213 OFFICE O/P EST LOW 20 MIN: CPT | Performed by: UROLOGY

## 2025-03-17 PROCEDURE — 1126F AMNT PAIN NOTED NONE PRSNT: CPT | Performed by: UROLOGY

## 2025-03-17 PROCEDURE — 1159F MED LIST DOCD IN RCRD: CPT | Performed by: UROLOGY

## 2025-03-17 PROCEDURE — 3079F DIAST BP 80-89 MM HG: CPT | Performed by: UROLOGY

## 2025-03-17 ASSESSMENT — ENCOUNTER SYMPTOMS
LOSS OF SENSATION IN FEET: 0
DEPRESSION: 0
OCCASIONAL FEELINGS OF UNSTEADINESS: 0

## 2025-03-17 ASSESSMENT — PAIN SCALES - GENERAL: PAINLEVEL_OUTOF10: 0-NO PAIN

## 2025-03-17 NOTE — LETTER
March 18, 2025     Evaristo Tijerina MD  [address] [facility]  [address]  [facility] [address]    Patient: Ricci Granados   YOB: 1945   Date of Visit: 3/17/2025       Dear Dr. Evaristo Tijerina MD:    Thank you for referring Ricci Granados to me for evaluation. Below are my notes for this consultation.  If you have questions, please do not hesitate to call me. I look forward to following your patient along with you.       Sincerely,     Cristi Bear MD      CC: No Recipients  ______________________________________________________________________________________    Subjective  Patient ID: Ricci Granados is a 79 y.o. male who presents for A F/U ON HIS MOTOR NEUROGENIC BLADDER.  PT TAUGHT ISC ON 11-17-24. PT I S CATHING 3 X / DAY -- PVR'S RANGE FROM 250 - 450 ML.   SERUM CREATININE IS DOWN TO 1.2 . PT IS NOT GETTING THE URGE TO VOID YET. PT IS PASSING A VERY LITTLE BIT OF URINE ON HIS OWN.    Assessment/Plan   A:  PT IS THOUGHT TO HAVE A SENSORY, MOTOR NEUROGENIC BLADDER SECONDARY TO CHRONIC OVER DISTENTION ? 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     H/O ED  P:  CONTINUE ISC TO KEEP  THE PVR'S UNDER 500 ML  PT WILL NEED TO CATH AT LEAST 3 X / DAY TO KEEP HIS RESIDUALS UNDER 500 ML TO ALLOW DECOMPRESSION OF THE KIDNEYS WHICH WILL HOPEFULLY IMPROVE HIS RENAL FAILURE.  CONTINUE:  FLOMAX 0.4 MG / DAY  SCRIPT FOR KEFLEX GIVEN TO THE PT TO USE PRN SXS OF A UTI IE: FEVER   F/U IN 3 MONTHS TO  ?  SCHEDULE: CYSTO, TRUSP AND CYSTOMETRICS     Cristi Bear MD 03/15/25 3:14 PM

## 2025-06-15 NOTE — PROGRESS NOTES
Subjective   Patient ID: Ricci Granados is a 80 y.o. male who presents  A F/U ON HIS MOTOR NEUROGENIC BLADDER.  PT TAUGHT ISC ON 11-17-24. PT I S CATHING 3 X / DAY -- PVR'S RANGE FROM 350 - 475 ML.   SERUM CREATININE IS DOWN TO 1.2 . PT IS BEGINNING TO GET THE URGE TO VOID  AND HE  IS PASSING A LITTLE MORE URINE ON HIS OWN.    HPI:  Are you experiencing:  Burning on urination -- NO  Pain on urination  -- NO  Urinary frequency -- NO  Urinary urgency -- OCC  Urge incontinence -- NO  Urinary stress incontinence  -- NO  Number of pads used per day -- NONE  Enuresis --  NO  Nocturia-- NO  Hematuria -- OCC -- VERY LITTLE  AFTER HE DOES ISC  Hesitancy -- YES  Post void fullness --  NO  Strength of your stream-- GOOD    General-- well developed, well nourished in NAD  Head-- normal cephalic, atraumatic  Eyes-- PERRL, EOM'S FROM,  no  jaundice  Neck-- Supple, without masses  Chest-- Normal bony structure  Abdomen-- soft, non tender, liver spleen not palpable . No supra pubic masses, RECTUS DIASTASES, SMALL UMBILICAL HERNIA   Back-- no flank masses palpable, no CVA tenderness on palpation or perc;ussion  Lymph nodes-- No inguinal lymphadenopathy noted  Prostate-- 2+, firm, smooth, non tender,without nodules  Testis-- both down, non tender, without masses  Epididymis-- no masses palpable  Scrotum -- no hydrocele noted  Extremities -- Normal muscle mass and tone for the patients age  Neurological-- oriented times three     Assessment/Plan   A:  PT IS THOUGHT TO HAVE A SENSORY, MOTOR NEUROGENIC BLADDER SECONDARY TO CHRONIC OVER DISTENTION ? RESULTING FROM BPH CAUSING BLADDER OUTLET OBSTRUCTION-- PT IS BEGINNING TO REGAIN SENSATION TO FILLING OF THE BLADDER AND IS PASSING A LITTLE MORE URINE ON HIS OWN.  PATHOPHYSIOLOGY OF THE ABOVE RE-DISCUSSED IN DETAIL WITH THE PT AND HIS WIFE  PT  IS DOING  ISC  AND KEEPING HIS PVR'S UNDER 500 ML FOR THE MOST PART      CHRONIC RENAL FAILURE SECONDARY TO THE ABOVE CAUSING BILATERAL  HYDRONEPHROSIS -- RESOLVING ON ISC THERAPY.     H/O ED  P:  CONTINUE ISC  TID TO KEEP  THE PVR'S UNDER 500 ML  CONTINUE:  FLOMAX 0.4 MG / DAY  SCRIPT FOR KEFLEX GIVEN TO THE PT TO USE PRN SXS OF A UTI IE: FEVER   SCHEDULE:  CYSTO, TRUSP TO SEE OF SURGICAL INTERVENTION WITH THE PROSTATE WILL ALLOW HIM TO VOID BETTER ON HIS OWN AND HE MAY BE ABLE TO STOP DOING ISC.    Cristi Bear MD 06/15/25 7:38 AM

## 2025-06-18 ENCOUNTER — APPOINTMENT (OUTPATIENT)
Age: 80
End: 2025-06-18
Payer: MEDICARE

## 2025-06-18 VITALS — SYSTOLIC BLOOD PRESSURE: 137 MMHG | TEMPERATURE: 98.9 F | HEART RATE: 65 BPM | DIASTOLIC BLOOD PRESSURE: 77 MMHG

## 2025-06-18 DIAGNOSIS — N31.2 ATONIC NEUROGENIC BLADDER: Primary | ICD-10-CM

## 2025-06-18 DIAGNOSIS — R39.15 URGENCY OF MICTURITION: ICD-10-CM

## 2025-06-18 DIAGNOSIS — R31.0 GROSS HEMATURIA: ICD-10-CM

## 2025-06-18 DIAGNOSIS — R33.9 URINARY RETENTION: ICD-10-CM

## 2025-06-18 DIAGNOSIS — N40.1 BENIGN PROSTATIC HYPERPLASIA WITH INCOMPLETE BLADDER EMPTYING: ICD-10-CM

## 2025-06-18 DIAGNOSIS — R39.14 BENIGN PROSTATIC HYPERPLASIA WITH INCOMPLETE BLADDER EMPTYING: ICD-10-CM

## 2025-06-18 PROCEDURE — 3078F DIAST BP <80 MM HG: CPT | Performed by: UROLOGY

## 2025-06-18 PROCEDURE — 1159F MED LIST DOCD IN RCRD: CPT | Performed by: UROLOGY

## 2025-06-18 PROCEDURE — 1036F TOBACCO NON-USER: CPT | Performed by: UROLOGY

## 2025-06-18 PROCEDURE — 99213 OFFICE O/P EST LOW 20 MIN: CPT | Performed by: UROLOGY

## 2025-06-18 PROCEDURE — 3075F SYST BP GE 130 - 139MM HG: CPT | Performed by: UROLOGY

## 2025-06-18 PROCEDURE — 1160F RVW MEDS BY RX/DR IN RCRD: CPT | Performed by: UROLOGY

## 2025-06-18 NOTE — LETTER
June 18, 2025     Evaristo Tijerina MD  [address] [facility]  [address]  [facility] [address]    Patient: Ricci Granados   YOB: 1945   Date of Visit: 6/18/2025       Dear Dr. Evaristo Tijerina MD:    Thank you for referring Ricci Granados to me for evaluation. Below are my notes for this consultation.  If you have questions, please do not hesitate to call me. I look forward to following your patient along with you.       Sincerely,     Cristi Bear MD      CC: No Recipients  ______________________________________________________________________________________    Subjective  Patient ID: Ricci Granados is a 80 y.o. male who presents  A F/U ON HIS MOTOR NEUROGENIC BLADDER.  PT TAUGHT ISC ON 11-17-24. PT I S CATHING 3 X / DAY -- PVR'S RANGE FROM 350 - 475 ML.   SERUM CREATININE IS DOWN TO 1.2 . PT IS BEGINNING TO GET THE URGE TO VOID  AND HE  IS PASSING A LITTLE MORE URINE ON HIS OWN.    HPI:  Are you experiencing:  Burning on urination -- NO  Pain on urination  -- NO  Urinary frequency -- NO  Urinary urgency -- OCC  Urge incontinence -- NO  Urinary stress incontinence  -- NO  Number of pads used per day -- NONE  Enuresis --  NO  Nocturia-- NO  Hematuria -- OCC -- VERY LITTLE  AFTER HE DOES ISC  Hesitancy -- YES  Post void fullness --  NO  Strength of your stream-- GOOD    General-- well developed, well nourished in NAD  Head-- normal cephalic, atraumatic  Eyes-- PERRL, EOM'S FROM,  no  jaundice  Neck-- Supple, without masses  Chest-- Normal bony structure  Abdomen-- soft, non tender, liver spleen not palpable . No supra pubic masses, RECTUS DIASTASES, SMALL UMBILICAL HERNIA   Back-- no flank masses palpable, no CVA tenderness on palpation or perc;ussion  Lymph nodes-- No inguinal lymphadenopathy noted  Prostate-- 2+, firm, smooth, non tender,without nodules  Testis-- both down, non tender, without masses  Epididymis-- no masses palpable  Scrotum -- no hydrocele noted  Extremities -- Normal muscle mass and  tone for the patients age  Neurological-- oriented times three     Assessment/Plan   A:  PT IS THOUGHT TO HAVE A SENSORY, MOTOR NEUROGENIC BLADDER SECONDARY TO CHRONIC OVER DISTENTION ? RESULTING FROM BPH CAUSING BLADDER OUTLET OBSTRUCTION-- PT IS BEGINNING TO REGAIN SENSATION TO FILLING OF THE BLADDER AND IS PASSING A LITTLE MORE URINE ON HIS OWN.  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     H/O ED  P:  CONTINUE ISC  TID TO KEEP  THE PVR'S UNDER 500 ML  CONTINUE:  FLOMAX 0.4 MG / DAY  SCRIPT FOR KEFLEX GIVEN TO THE PT TO USE PRN SXS OF A UTI IE: FEVER   SCHEDULE:  CYSTO, TRUSP TO SEE OF SURGICAL INTERVENTION WITH THE PROSTATE WILL ALLOW HIM TO VOID BETTER ON HIS OWN AND HE MAY BE ABLE TO STOP DOING ISC.    Cristi Bear MD 06/15/25 7:38 AM

## 2025-07-06 NOTE — PROGRESS NOTES
Patient ID: Ricci Granados is a 80 y.o. male. PRESENTS FOR A CYSTO TRUSP TO DEFINE THE CAUSE OF HIS URINARY RETENTION. PT IS CATHING 3 X / DAY --PVR'S - 230 ML    PROCEDURE:; CYSTOSCOPY   PRE OP:  H/O URINARY RETENTION   POST OP -- BPH CAUSING BLADDER OUTLET OBSTRUCTION  SURGEON -- SANDOVAL HUBBARD:  1 % LIDOCAINE  FINDINGS:  After informed consent was obtained, the patient was taken to the procedure room for cystoscopy due to A H/O URNARY RETENTION    Cystoscopy     Procedure Note:    A sterile prep and drape was performed in standard fashion. Lidocaine was used for topical anesthesia. A flexible cystoscope was inserted into the urethra without difficulty revealing normal urethra.     The prostate OBSTRUCTING-- VERY LARGE      Then entered the bladder revealing bladder mucosa with no erythematous patches or plaques, foreign bodies, stones or papillary lesions. MILD TRABECULATION  The ureteral orifices were visualized bilaterally. These were orthotopic in location and effluxing clear urine. No masses were seen on retroflexion.     Post-Procedure:   The cystoscope was removed. The vital signs were stable . The patient tolerated the procedure well. There were no complications.     BRIEF OP NOTE:  Procedure  TRUSP BX  PRE OP DX -- URINARY RETENTION  POST OP DX --  SURGEON -- SANDOVAL HUBBARD-- 1% LIDOCAINE-- 10 ML  FINDINGS:  HEIGHT-- 52.4 MM  WIDTH -- 60.6 MM  LENGTH--  66.2 MM  PROSTATE SIZE -- 110 GMS  HYPOECHOIC AREAS-- NONE     Assessment/Plan   A:  VERY LARGE PROSTATE CAUSING BLADDER OUTLET OBSTRUCTION    PT IS THOUGHT TO HAVE A SENSORY, MOTOR NEUROGENIC BLADDER SECONDARY TO CHRONIC OVER DISTENTION ? RESULTING FROM BPH CAUSING BLADDER OUTLET OBSTRUCTION-- PT IS BEGINNING TO REGAIN SENSATION TO FILLING OF THE BLADDER AND IS PASSING A LITTLE MORE URINE ON HIS OWN.  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     H/O ED  P:  CONTINUE ISC  TID TO KEEP  THE PVR'S UNDER 500 ML  CONTINUE:  FLOMAX 0.4 MG / DAY  SCRIPT FOR KEFLEX GIVEN TO THE PT TO USE PRN SXS OF A UTI IE: FEVER  WILL REFER THE PT TO DR SPARKS FOR A POSSIBLE HOLEP TO SEE IF THIS WILL ALLOW HIM TO VOID ON HIS OWN   JARRELL NICK MD  7-7-25  11:00

## 2025-07-07 ENCOUNTER — APPOINTMENT (OUTPATIENT)
Age: 80
End: 2025-07-07
Payer: MEDICARE

## 2025-07-07 VITALS
TEMPERATURE: 98.9 F | WEIGHT: 164 LBS | BODY MASS INDEX: 24.22 KG/M2 | HEART RATE: 60 BPM | DIASTOLIC BLOOD PRESSURE: 88 MMHG | SYSTOLIC BLOOD PRESSURE: 184 MMHG

## 2025-07-07 DIAGNOSIS — R33.8 BENIGN PROSTATIC HYPERPLASIA WITH URINARY RETENTION: ICD-10-CM

## 2025-07-07 DIAGNOSIS — N31.2 NEUROGENIC BLADDER, FLACCID: ICD-10-CM

## 2025-07-07 DIAGNOSIS — R33.9 URINARY RETENTION: ICD-10-CM

## 2025-07-07 DIAGNOSIS — N40.1 BENIGN PROSTATIC HYPERPLASIA WITH URINARY RETENTION: ICD-10-CM

## 2025-07-07 PROCEDURE — 76872 US TRANSRECTAL: CPT | Performed by: UROLOGY

## 2025-07-07 PROCEDURE — 52000 CYSTOURETHROSCOPY: CPT | Performed by: UROLOGY

## 2025-07-07 PROCEDURE — 76942 ECHO GUIDE FOR BIOPSY: CPT | Performed by: UROLOGY

## 2025-07-07 PROCEDURE — 99213 OFFICE O/P EST LOW 20 MIN: CPT | Performed by: UROLOGY

## 2025-07-07 RX ORDER — LIDOCAINE HYDROCHLORIDE 20 MG/ML
1 JELLY TOPICAL ONCE
Status: COMPLETED | OUTPATIENT
Start: 2025-07-07 | End: 2025-07-07

## 2025-07-07 RX ORDER — CHOLECALCIFEROL (VITAMIN D3) 25 MCG
25 TABLET ORAL DAILY
COMMUNITY

## 2025-07-07 RX ADMIN — LIDOCAINE HYDROCHLORIDE 1 APPLICATION: 20 JELLY TOPICAL at 10:30

## 2025-07-07 NOTE — LETTER
July 8, 2025     Ricci Zaragoza MD  [facility]- [address] [facility] Of The [address] [address]    Patient: Ricci Granados   YOB: 1945   Date of Visit: 7/7/2025       Dear Dr. Ricci Zaragoza MD:    Thank you for referring Ricci Granados to me for evaluation. Below are my notes for this consultation.  If you have questions, please do not hesitate to call me. I look forward to following your patient along with you.       Sincerely,     Cristi Bear MD      CC: No Recipients  ______________________________________________________________________________________    Patient ID: Ricci Granados is a 80 y.o. male. PRESENTS FOR A CYSTO TRUSP TO DEFINE THE CAUSE OF HIS URINARY RETENTION. PT IS CATHING 3 X / DAY --PVR'S - 230 ML    PROCEDURE:; CYSTOSCOPY   PRE OP:  H/O URINARY RETENTION   POST OP -- BPH CAUSING BLADDER OUTLET OBSTRUCTION  SURGEON -- SANDOVAL BONILLA  ANES:  1 % LIDOCAINE  FINDINGS:  After informed consent was obtained, the patient was taken to the procedure room for cystoscopy due to A H/O URNARY RETENTION    Cystoscopy     Procedure Note:    A sterile prep and drape was performed in standard fashion. Lidocaine was used for topical anesthesia. A flexible cystoscope was inserted into the urethra without difficulty revealing normal urethra.     The prostate OBSTRUCTING-- VERY LARGE      Then entered the bladder revealing bladder mucosa with no erythematous patches or plaques, foreign bodies, stones or papillary lesions. MILD TRABECULATION  The ureteral orifices were visualized bilaterally. These were orthotopic in location and effluxing clear urine. No masses were seen on retroflexion.     Post-Procedure:   The cystoscope was removed. The vital signs were stable . The patient tolerated the procedure well. There were no complications.     BRIEF OP NOTE:  Procedure  TRUSP BX  PRE OP DX -- URINARY RETENTION  POST OP DX --  SURGEON -- SANDOVAL HUBBARD-- 1% LIDOCAINE--  10 ML  FINDINGS:  HEIGHT-- 52.4 MM  WIDTH -- 60.6 MM  LENGTH--  66.2 MM  PROSTATE SIZE -- 110 GMS  HYPOECHOIC AREAS-- NONE     Assessment/Plan   A:  VERY LARGE PROSTATE CAUSING BLADDER OUTLET OBSTRUCTION    PT IS THOUGHT TO HAVE A SENSORY, MOTOR NEUROGENIC BLADDER SECONDARY TO CHRONIC OVER DISTENTION ? RESULTING FROM BPH CAUSING BLADDER OUTLET OBSTRUCTION-- PT IS BEGINNING TO REGAIN SENSATION TO FILLING OF THE BLADDER AND IS PASSING A LITTLE MORE URINE ON HIS OWN.  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     H/O ED  P:  CONTINUE ISC  TID TO KEEP  THE PVR'S UNDER 500 ML  CONTINUE:  FLOMAX 0.4 MG / DAY  SCRIPT FOR KEFLEX GIVEN TO THE PT TO USE PRN SXS OF A UTI IE: FEVER  WILL REFER THE PT TO DR SPARKS FOR A POSSIBLE HOLEP TO SEE IF THIS WILL ALLOW HIM TO VOID ON HIS OWN   JARRELL NICK MD  7-7-25  11:00

## 2025-07-07 NOTE — LETTER
July 8, 2025     Evaristo Tijerina MD  [address] [facility]  [address]  [facility] [address]    Patient: Ricci Granados   YOB: 1945   Date of Visit: 7/7/2025       Dear Dr. Evaristo Tijerina MD:    Thank you for referring Ricci Granados to me for evaluation. Below are my notes for this consultation.  If you have questions, please do not hesitate to call me. I look forward to following your patient along with you.       Sincerely,     Cristi Bear MD      CC: No Recipients  ______________________________________________________________________________________    Patient ID: Ricci Granados is a 80 y.o. male. PRESENTS FOR A CYSTO TRUSP TO DEFINE THE CAUSE OF HIS URINARY RETENTION. PT IS CATHING 3 X / DAY --PVR'S - 230 ML    PROCEDURE:; CYSTOSCOPY   PRE OP:  H/O URINARY RETENTION   POST OP -- BPH CAUSING BLADDER OUTLET OBSTRUCTION  SURGEON -- SANDOVAL BONILLA  ANES:  1 % LIDOCAINE  FINDINGS:  After informed consent was obtained, the patient was taken to the procedure room for cystoscopy due to A H/O URNARY RETENTION    Cystoscopy     Procedure Note:    A sterile prep and drape was performed in standard fashion. Lidocaine was used for topical anesthesia. A flexible cystoscope was inserted into the urethra without difficulty revealing normal urethra.     The prostate OBSTRUCTING-- VERY LARGE      Then entered the bladder revealing bladder mucosa with no erythematous patches or plaques, foreign bodies, stones or papillary lesions. MILD TRABECULATION  The ureteral orifices were visualized bilaterally. These were orthotopic in location and effluxing clear urine. No masses were seen on retroflexion.     Post-Procedure:   The cystoscope was removed. The vital signs were stable . The patient tolerated the procedure well. There were no complications.     BRIEF OP NOTE:  Procedure  TRUSP BX  PRE OP DX -- URINARY RETENTION  POST OP DX --  SURGEON -- SANDOVAL  ANES-- 1% LIDOCAINE-- 10 ML  FINDINGS:  HEIGHT-- 52.4 MM  WIDTH --  60.6 MM  LENGTH--  66.2 MM  PROSTATE SIZE -- 110 GMS  HYPOECHOIC AREAS-- NONE     Assessment/Plan   A:  VERY LARGE PROSTATE CAUSING BLADDER OUTLET OBSTRUCTION    PT IS THOUGHT TO HAVE A SENSORY, MOTOR NEUROGENIC BLADDER SECONDARY TO CHRONIC OVER DISTENTION ? RESULTING FROM BPH CAUSING BLADDER OUTLET OBSTRUCTION-- PT IS BEGINNING TO REGAIN SENSATION TO FILLING OF THE BLADDER AND IS PASSING A LITTLE MORE URINE ON HIS OWN.  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     H/O ED  P:  CONTINUE ISC  TID TO KEEP  THE PVR'S UNDER 500 ML  CONTINUE:  FLOMAX 0.4 MG / DAY  SCRIPT FOR KEFLEX GIVEN TO THE PT TO USE PRN SXS OF A UTI IE: FEVER  WILL REFER THE PT TO DR SPARKS FOR A POSSIBLE HOLEP TO SEE IF THIS WILL ALLOW HIM TO VOID ON HIS OWN   JARRELL NICK MD  7-7-25  11:00

## 2025-07-08 ENCOUNTER — APPOINTMENT (OUTPATIENT)
Dept: PRIMARY CARE | Facility: CLINIC | Age: 80
End: 2025-07-08
Payer: MEDICARE

## 2025-07-08 VITALS
OXYGEN SATURATION: 95 % | HEART RATE: 68 BPM | RESPIRATION RATE: 14 BRPM | SYSTOLIC BLOOD PRESSURE: 128 MMHG | BODY MASS INDEX: 26.59 KG/M2 | DIASTOLIC BLOOD PRESSURE: 72 MMHG | WEIGHT: 169.4 LBS | HEIGHT: 67 IN

## 2025-07-08 DIAGNOSIS — Z00.00 MEDICARE ANNUAL WELLNESS VISIT, SUBSEQUENT: Primary | ICD-10-CM

## 2025-07-08 DIAGNOSIS — I10 BENIGN ESSENTIAL HYPERTENSION: ICD-10-CM

## 2025-07-08 DIAGNOSIS — Z13.220 SCREENING CHOLESTEROL LEVEL: ICD-10-CM

## 2025-07-08 DIAGNOSIS — N18.31 STAGE 3A CHRONIC KIDNEY DISEASE (MULTI): ICD-10-CM

## 2025-07-08 PROCEDURE — 99397 PER PM REEVAL EST PAT 65+ YR: CPT | Performed by: INTERNAL MEDICINE

## 2025-07-08 PROCEDURE — 1159F MED LIST DOCD IN RCRD: CPT | Performed by: INTERNAL MEDICINE

## 2025-07-08 PROCEDURE — 3074F SYST BP LT 130 MM HG: CPT | Performed by: INTERNAL MEDICINE

## 2025-07-08 PROCEDURE — 1170F FXNL STATUS ASSESSED: CPT | Performed by: INTERNAL MEDICINE

## 2025-07-08 PROCEDURE — 3078F DIAST BP <80 MM HG: CPT | Performed by: INTERNAL MEDICINE

## 2025-07-08 PROCEDURE — 1036F TOBACCO NON-USER: CPT | Performed by: INTERNAL MEDICINE

## 2025-07-08 PROCEDURE — 1158F ADVNC CARE PLAN TLK DOCD: CPT | Performed by: INTERNAL MEDICINE

## 2025-07-08 PROCEDURE — G0439 PPPS, SUBSEQ VISIT: HCPCS | Performed by: INTERNAL MEDICINE

## 2025-07-08 RX ORDER — CARVEDILOL PHOSPHATE 10 MG/1
10 CAPSULE, EXTENDED RELEASE ORAL DAILY
Qty: 90 CAPSULE | Refills: 3 | Status: SHIPPED | OUTPATIENT
Start: 2025-07-08 | End: 2026-07-08

## 2025-07-08 ASSESSMENT — ACTIVITIES OF DAILY LIVING (ADL)
GROCERY_SHOPPING: INDEPENDENT
DRESSING: INDEPENDENT
DOING_HOUSEWORK: INDEPENDENT
MANAGING_FINANCES: INDEPENDENT
TAKING_MEDICATION: INDEPENDENT
BATHING: INDEPENDENT

## 2025-07-08 ASSESSMENT — ENCOUNTER SYMPTOMS
CONSTIPATION: 0
SLEEP DISTURBANCE: 0
DEPRESSION: 0
BLOOD IN STOOL: 0
FATIGUE: 0
DIZZINESS: 0
OCCASIONAL FEELINGS OF UNSTEADINESS: 0
LOSS OF SENSATION IN FEET: 0
SHORTNESS OF BREATH: 0
HEADACHES: 0

## 2025-07-08 NOTE — ASSESSMENT & PLAN NOTE
-BP remains controlled on coreg 10mg daily. Refilling today.  Orders:    Comprehensive metabolic panel; Future    CBC; Future    carvedilol CR (Coreg CR) 10 mg 24 hr capsule; Take 1 capsule (10 mg) by mouth once daily. Do not crush or chew.

## 2025-07-08 NOTE — PROGRESS NOTES
"Subjective   Reason for Visit: Ricci Granados is an 80 y.o. male here for a Medicare Wellness visit.     Past Medical, Surgical, and Family History reviewed and updated in chart.    Reviewed all medications by prescribing practitioner or clinical pharmacist (such as prescriptions, OTCs, herbal therapies and supplements) and documented in the medical record.    Pt here for his MAW.    Goes to the gym 6 days a week, or goes for a walk in the park.    Working w/ urology still on prostate. Had cystoscopy yday. Plan is to see Dr. Zaragoza next.        Patient Care Team:  Evaristo Tijerina MD as PCP - General (Internal Medicine)  Wes Green DO as PCP - [employer] PCP  Saad Brown MD as Consulting Physician (Nephrology)  Cristi Bear MD as Consulting Physician (Urology)     Pt is not considered to be at high risk of opioid abuse after reviewing chart.    Review of Systems   Constitutional:  Negative for fatigue.   Respiratory:  Negative for shortness of breath.    Cardiovascular:  Negative for chest pain.   Gastrointestinal:  Negative for blood in stool and constipation.   Neurological:  Negative for dizziness and headaches.   Psychiatric/Behavioral:  Negative for sleep disturbance.        Objective   Vitals:  /72 (BP Location: Right arm, Patient Position: Sitting) Comment: manual  Pulse 68   Resp 14   Ht 1.697 m (5' 6.8\")   Wt 76.8 kg (169 lb 6.4 oz)   SpO2 95%   BMI 26.69 kg/m²       Physical Exam  Constitutional:       General: He is not in acute distress.     Appearance: He is not ill-appearing, toxic-appearing or diaphoretic.   HENT:      Head: Normocephalic and atraumatic.   Eyes:      General: No scleral icterus.     Conjunctiva/sclera: Conjunctivae normal.   Cardiovascular:      Rate and Rhythm: Normal rate and regular rhythm.      Heart sounds: No murmur heard.     No friction rub. No gallop.   Pulmonary:      Effort: Pulmonary effort is normal. No respiratory distress.      Breath " sounds: No stridor. No wheezing, rhonchi or rales.   Abdominal:      General: Abdomen is flat. Bowel sounds are normal. There is no distension.      Palpations: Abdomen is soft.      Tenderness: There is no abdominal tenderness. There is no guarding.   Musculoskeletal:      Cervical back: Normal range of motion and neck supple. No tenderness.   Lymphadenopathy:      Cervical: No cervical adenopathy.   Skin:     General: Skin is warm and dry.   Neurological:      Mental Status: He is alert.       Assessment/Plan   Assessment & Plan  Medicare annual wellness visit, subsequent         Benign essential hypertension  -BP remains controlled on coreg 10mg daily. Refilling today.  Orders:    Comprehensive metabolic panel; Future    CBC; Future    carvedilol CR (Coreg CR) 10 mg 24 hr capsule; Take 1 capsule (10 mg) by mouth once daily. Do not crush or chew.    Stage 3a chronic kidney disease (Multi)  -Seeing Dr. Brown. Will repeat lvls to ensure they are stable.  Orders:    Albumin-Creatinine Ratio, Urine Random; Future    Screening cholesterol level  -Lvls good when last checked. Will recheck.  Orders:    Lipid panel; Future     -Labwork as above.  -Commended pt for exercise. Discussed diet, sleep, and socialization (all of which pt is doing).  -Will continue to follow with urology.    Advance Directives Discussion  Advanced Care Planning (including a Living Will, Healthcare POA, as well as specific end of life choices and/or directives), was discussed with the patient and/or surrogate, voluntarily, and details of that discussion documented in the Problem List (under Advanced Directives Discussion) of the medical record.  Pt has a living will and HCPOA. Contacts still the same in the chart. Pt confirms he still wishes to be full code.

## 2025-07-11 LAB
ALBUMIN SERPL-MCNC: 4.2 G/DL (ref 3.6–5.1)
ALBUMIN/CREAT UR: 63 MG/G CREAT
ALP SERPL-CCNC: 109 U/L (ref 35–144)
ALT SERPL-CCNC: 10 U/L (ref 9–46)
ANION GAP SERPL CALCULATED.4IONS-SCNC: 7 MMOL/L (CALC) (ref 7–17)
AST SERPL-CCNC: 14 U/L (ref 10–35)
BILIRUB SERPL-MCNC: 0.8 MG/DL (ref 0.2–1.2)
BUN SERPL-MCNC: 20 MG/DL (ref 7–25)
CALCIUM SERPL-MCNC: 8.9 MG/DL (ref 8.6–10.3)
CHLORIDE SERPL-SCNC: 105 MMOL/L (ref 98–110)
CHOLEST SERPL-MCNC: 137 MG/DL
CHOLEST/HDLC SERPL: 3 (CALC)
CO2 SERPL-SCNC: 27 MMOL/L (ref 20–32)
CREAT SERPL-MCNC: 1.29 MG/DL (ref 0.7–1.28)
CREAT UR-MCNC: 41 MG/DL (ref 20–320)
EGFRCR SERPLBLD CKD-EPI 2021: 57 ML/MIN/1.73M2
ERYTHROCYTE [DISTWIDTH] IN BLOOD BY AUTOMATED COUNT: 13.1 % (ref 11–15)
GLUCOSE SERPL-MCNC: 87 MG/DL (ref 65–99)
HCT VFR BLD AUTO: 45.1 % (ref 38.5–50)
HDLC SERPL-MCNC: 45 MG/DL
HGB BLD-MCNC: 15 G/DL (ref 13.2–17.1)
LDLC SERPL CALC-MCNC: 78 MG/DL (CALC)
MCH RBC QN AUTO: 35 PG (ref 27–33)
MCHC RBC AUTO-ENTMCNC: 33.3 G/DL (ref 32–36)
MCV RBC AUTO: 105.4 FL (ref 80–100)
MICROALBUMIN UR-MCNC: 2.6 MG/DL
NONHDLC SERPL-MCNC: 92 MG/DL (CALC)
PLATELET # BLD AUTO: 113 THOUSAND/UL (ref 140–400)
PMV BLD REES-ECKER: 10.5 FL (ref 7.5–12.5)
POTASSIUM SERPL-SCNC: 4.5 MMOL/L (ref 3.5–5.3)
PROT SERPL-MCNC: 6.9 G/DL (ref 6.1–8.1)
RBC # BLD AUTO: 4.28 MILLION/UL (ref 4.2–5.8)
SODIUM SERPL-SCNC: 139 MMOL/L (ref 135–146)
TRIGL SERPL-MCNC: 65 MG/DL
WBC # BLD AUTO: 4.3 THOUSAND/UL (ref 3.8–10.8)

## 2025-08-27 ENCOUNTER — APPOINTMENT (OUTPATIENT)
Dept: UROLOGY | Facility: HOSPITAL | Age: 80
End: 2025-08-27
Payer: MEDICARE

## 2025-08-27 ENCOUNTER — PREP FOR PROCEDURE (OUTPATIENT)
Dept: UROLOGY | Facility: HOSPITAL | Age: 80
End: 2025-08-27

## 2025-08-27 DIAGNOSIS — R33.8 BENIGN PROSTATIC HYPERPLASIA WITH URINARY RETENTION: ICD-10-CM

## 2025-08-27 DIAGNOSIS — N40.1 BENIGN PROSTATIC HYPERPLASIA WITH URINARY RETENTION: ICD-10-CM

## 2025-08-27 DIAGNOSIS — R33.9 RETENTION OF URINE: Primary | ICD-10-CM

## 2025-08-27 PROCEDURE — 99214 OFFICE O/P EST MOD 30 MIN: CPT | Performed by: UROLOGY

## 2025-08-27 PROCEDURE — 99202 OFFICE O/P NEW SF 15 MIN: CPT

## 2025-08-27 PROCEDURE — 1159F MED LIST DOCD IN RCRD: CPT | Performed by: UROLOGY

## 2025-08-27 PROCEDURE — G2211 COMPLEX E/M VISIT ADD ON: HCPCS | Performed by: UROLOGY

## 2025-08-27 RX ORDER — CEFAZOLIN SODIUM 2 G/100ML
2 INJECTION, SOLUTION INTRAVENOUS ONCE
OUTPATIENT
Start: 2025-08-27 | End: 2025-08-27

## 2025-08-27 RX ORDER — SULFAMETHOXAZOLE AND TRIMETHOPRIM 800; 160 MG/1; MG/1
1 TABLET ORAL 2 TIMES DAILY
Qty: 16 TABLET | Refills: 0 | Status: SHIPPED | OUTPATIENT
Start: 2025-08-27 | End: 2025-09-04

## 2025-09-04 ENCOUNTER — PRE-ADMISSION TESTING (OUTPATIENT)
Dept: PREADMISSION TESTING | Facility: HOSPITAL | Age: 80
End: 2025-09-04
Payer: MEDICARE

## 2025-09-04 ENCOUNTER — LAB (OUTPATIENT)
Dept: LAB | Facility: HOSPITAL | Age: 80
End: 2025-09-04
Payer: MEDICARE

## 2025-09-04 VITALS
OXYGEN SATURATION: 95 % | SYSTOLIC BLOOD PRESSURE: 139 MMHG | HEART RATE: 67 BPM | BODY MASS INDEX: 25.39 KG/M2 | DIASTOLIC BLOOD PRESSURE: 67 MMHG | HEIGHT: 68 IN | TEMPERATURE: 97.9 F | RESPIRATION RATE: 16 BRPM | WEIGHT: 167.55 LBS

## 2025-09-04 DIAGNOSIS — Z01.818 PREOP TESTING: Primary | ICD-10-CM

## 2025-09-04 DIAGNOSIS — R39.9 LOWER URINARY TRACT SYMPTOMS (LUTS): ICD-10-CM

## 2025-09-04 DIAGNOSIS — Z01.818 ENCOUNTER FOR OTHER PREPROCEDURAL EXAMINATION: Primary | ICD-10-CM

## 2025-09-04 LAB
ANION GAP SERPL CALC-SCNC: 9 MMOL/L (ref 10–20)
APPEARANCE UR: CLEAR
BASOPHILS # BLD AUTO: 0.02 X10*3/UL (ref 0–0.1)
BASOPHILS NFR BLD AUTO: 0.5 %
BILIRUB UR STRIP.AUTO-MCNC: NEGATIVE MG/DL
BUN SERPL-MCNC: 21 MG/DL (ref 6–23)
CALCIUM SERPL-MCNC: 8.7 MG/DL (ref 8.6–10.3)
CHLORIDE SERPL-SCNC: 105 MMOL/L (ref 98–107)
CO2 SERPL-SCNC: 27 MMOL/L (ref 21–32)
COLOR UR: ABNORMAL
CREAT SERPL-MCNC: 1.46 MG/DL (ref 0.5–1.3)
EGFRCR SERPLBLD CKD-EPI 2021: 49 ML/MIN/1.73M*2
EOSINOPHIL # BLD AUTO: 0.05 X10*3/UL (ref 0–0.4)
EOSINOPHIL NFR BLD AUTO: 1.3 %
ERYTHROCYTE [DISTWIDTH] IN BLOOD BY AUTOMATED COUNT: 12.9 % (ref 11.5–14.5)
GLUCOSE SERPL-MCNC: 96 MG/DL (ref 74–99)
GLUCOSE UR STRIP.AUTO-MCNC: NORMAL MG/DL
HCT VFR BLD AUTO: 43.5 % (ref 41–52)
HGB BLD-MCNC: 14 G/DL (ref 13.5–17.5)
IMM GRANULOCYTES # BLD AUTO: 0.01 X10*3/UL (ref 0–0.5)
IMM GRANULOCYTES NFR BLD AUTO: 0.3 % (ref 0–0.9)
KETONES UR STRIP.AUTO-MCNC: NEGATIVE MG/DL
LEUKOCYTE ESTERASE UR QL STRIP.AUTO: ABNORMAL
LYMPHOCYTES # BLD AUTO: 1.24 X10*3/UL (ref 0.8–3)
LYMPHOCYTES NFR BLD AUTO: 32.2 %
MCH RBC QN AUTO: 33.8 PG (ref 26–34)
MCHC RBC AUTO-ENTMCNC: 32.2 G/DL (ref 32–36)
MCV RBC AUTO: 105 FL (ref 80–100)
MONOCYTES # BLD AUTO: 0.41 X10*3/UL (ref 0.05–0.8)
MONOCYTES NFR BLD AUTO: 10.6 %
NEUTROPHILS # BLD AUTO: 2.12 X10*3/UL (ref 1.6–5.5)
NEUTROPHILS NFR BLD AUTO: 55.1 %
NITRITE UR QL STRIP.AUTO: ABNORMAL
NRBC BLD-RTO: 0 /100 WBCS (ref 0–0)
PH UR STRIP.AUTO: 7 [PH]
PLATELET # BLD AUTO: 118 X10*3/UL (ref 150–450)
POTASSIUM SERPL-SCNC: 4.3 MMOL/L (ref 3.5–5.3)
PROT UR STRIP.AUTO-MCNC: NEGATIVE MG/DL
RBC # BLD AUTO: 4.14 X10*6/UL (ref 4.5–5.9)
RBC # UR STRIP.AUTO: NEGATIVE MG/DL
RBC #/AREA URNS AUTO: ABNORMAL /HPF
SODIUM SERPL-SCNC: 137 MMOL/L (ref 136–145)
SP GR UR STRIP.AUTO: 1.01
UROBILINOGEN UR STRIP.AUTO-MCNC: NORMAL MG/DL
WBC # BLD AUTO: 3.9 X10*3/UL (ref 4.4–11.3)
WBC #/AREA URNS AUTO: ABNORMAL /HPF

## 2025-09-04 PROCEDURE — 81001 URINALYSIS AUTO W/SCOPE: CPT | Performed by: PHYSICIAN ASSISTANT

## 2025-09-04 PROCEDURE — 93005 ELECTROCARDIOGRAM TRACING: CPT | Performed by: PHYSICIAN ASSISTANT

## 2025-09-04 PROCEDURE — 87086 URINE CULTURE/COLONY COUNT: CPT | Mod: AHULAB | Performed by: PHYSICIAN ASSISTANT

## 2025-09-04 PROCEDURE — 80048 BASIC METABOLIC PNL TOTAL CA: CPT

## 2025-09-04 PROCEDURE — 36415 COLL VENOUS BLD VENIPUNCTURE: CPT

## 2025-09-04 PROCEDURE — 99204 OFFICE O/P NEW MOD 45 MIN: CPT | Performed by: PHYSICIAN ASSISTANT

## 2025-09-04 PROCEDURE — 85025 COMPLETE CBC W/AUTO DIFF WBC: CPT

## 2025-09-04 PROCEDURE — 93010 ELECTROCARDIOGRAM REPORT: CPT | Performed by: INTERNAL MEDICINE

## 2025-09-04 ASSESSMENT — ENCOUNTER SYMPTOMS
SINUS CONGESTION: 0
NECK STIFFNESS: 0
CONFUSION: 0
FEVER: 0
MYALGIAS: 0
ABDOMINAL PAIN: 0
NAUSEA: 0
CONSTIPATION: 0
ABDOMINAL DISTENTION: 0
ARTHRALGIAS: 1
SHORTNESS OF BREATH: 0
BLOOD IN STOOL: 0
NECK PAIN: 0
EYE DISCHARGE: 0
HEMOPTYSIS: 0
DIFFICULTY URINATING: 0
EXCESSIVE BLEEDING: 0
WOUND: 0
UNEXPECTED WEIGHT CHANGE: 0
RHINORRHEA: 0
VISUAL CHANGE: 0
VOMITING: 0
LIMITED RANGE OF MOTION: 0
EYE PAIN: 0
CHILLS: 0
NUMBNESS: 0
LIGHT-HEADEDNESS: 0
WEAKNESS: 0
DYSPNEA AT REST: 0
DYSURIA: 0
DYSPNEA WITH EXERTION: 0
TROUBLE SWALLOWING: 0
COUGH: 0
WHEEZING: 0
PALPITATIONS: 0
DOUBLE VISION: 0
DIARRHEA: 0
SKIN CHANGES: 0
BRUISES/BLEEDS EASILY: 0

## 2025-09-05 LAB
ATRIAL RATE: 71 BPM
P AXIS: 38 DEGREES
P OFFSET: 210 MS
P ONSET: 141 MS
PR INTERVAL: 174 MS
Q ONSET: 228 MS
QRS COUNT: 12 BEATS
QRS DURATION: 78 MS
QT INTERVAL: 398 MS
QTC CALCULATION(BAZETT): 432 MS
QTC FREDERICIA: 421 MS
R AXIS: 74 DEGREES
T AXIS: 26 DEGREES
T OFFSET: 427 MS
VENTRICULAR RATE: 71 BPM

## 2025-09-06 LAB — BACTERIA UR CULT: NORMAL

## 2025-10-06 ENCOUNTER — APPOINTMENT (OUTPATIENT)
Dept: UROLOGY | Facility: HOSPITAL | Age: 80
End: 2025-10-06
Payer: MEDICARE

## 2025-10-27 ENCOUNTER — APPOINTMENT (OUTPATIENT)
Dept: UROLOGY | Facility: HOSPITAL | Age: 80
End: 2025-10-27
Payer: MEDICARE

## 2026-07-09 ENCOUNTER — APPOINTMENT (OUTPATIENT)
Dept: PRIMARY CARE | Facility: CLINIC | Age: 81
End: 2026-07-09
Payer: MEDICARE